# Patient Record
Sex: MALE | Race: BLACK OR AFRICAN AMERICAN | NOT HISPANIC OR LATINO | Employment: FULL TIME | URBAN - METROPOLITAN AREA
[De-identification: names, ages, dates, MRNs, and addresses within clinical notes are randomized per-mention and may not be internally consistent; named-entity substitution may affect disease eponyms.]

---

## 2018-09-17 ENCOUNTER — HOSPITAL ENCOUNTER (EMERGENCY)
Facility: HOSPITAL | Age: 48
Discharge: HOME | End: 2018-09-17
Attending: EMERGENCY MEDICINE | Admitting: EMERGENCY MEDICINE
Payer: COMMERCIAL

## 2018-09-17 VITALS
TEMPERATURE: 98.5 F | OXYGEN SATURATION: 99 % | RESPIRATION RATE: 18 BRPM | SYSTOLIC BLOOD PRESSURE: 149 MMHG | DIASTOLIC BLOOD PRESSURE: 95 MMHG | HEART RATE: 58 BPM

## 2018-09-17 DIAGNOSIS — G43.809 MIGRAINE VARIANTS, NOT INTRACTABLE: Primary | ICD-10-CM

## 2018-09-17 PROCEDURE — 99283 EMERGENCY DEPT VISIT LOW MDM: CPT

## 2018-09-17 RX ORDER — TOPIRAMATE 25 MG/1
25 CAPSULE, COATED PELLETS ORAL 2 TIMES DAILY
Qty: 60 CAPSULE | Refills: 0 | Status: SHIPPED | OUTPATIENT
Start: 2018-09-17 | End: 2018-10-17

## 2018-09-17 RX ORDER — SUMATRIPTAN 25 MG/1
25 TABLET, FILM COATED ORAL ONCE AS NEEDED
Qty: 10 TABLET | Refills: 0 | Status: SHIPPED | OUTPATIENT
Start: 2018-09-17

## 2018-09-17 RX ORDER — METOCLOPRAMIDE 10 MG/1
10 TABLET ORAL ONCE
Status: COMPLETED | OUTPATIENT
Start: 2018-09-17 | End: 2018-09-17

## 2018-09-17 RX ORDER — METOCLOPRAMIDE 10 MG/1
10 TABLET ORAL EVERY 6 HOURS
Qty: 30 TABLET | Refills: 0 | Status: SHIPPED | OUTPATIENT
Start: 2018-09-17

## 2018-09-17 RX ADMIN — METOCLOPRAMIDE HYDROCHLORIDE 10 MG: 10 TABLET ORAL at 21:39

## 2018-09-18 NOTE — DISCHARGE INSTRUCTIONS
Acute Headache   WHAT YOU NEED TO KNOW:   An acute headache is pain or discomfort that starts suddenly and gets worse quickly  You may have an acute headache only when you feel stress or eat certain foods  Other acute headache pain can happen every day, and sometimes several times a day  DISCHARGE INSTRUCTIONS:   Return to the emergency department if:   · You have severe pain  · You have numbness or weakness on one side of your face or body  · You have a headache that occurs after a blow to the head, a fall, or other trauma  · You have a headache, are forgetful or confused, or have trouble speaking  · You have a headache, stiff neck, and a fever  Contact your healthcare provider if:   · You have a constant headache and are vomiting  · You have a headache each day that does not get better, even after treatment  · You have changes in your headaches, or new symptoms that occur when you have a headache  · You have questions or concerns about your condition or care  Medicines: You may need any of the following:  · Prescription pain medicine  may be given  The medicine your healthcare provider recommends will depend on the kind of headaches you have  You will need to take prescription headache medicines as directed to prevent a problem called rebound headache  These headaches happen with regular use of pain relievers for headache disorders  · NSAIDs , such as ibuprofen, help decrease swelling, pain, and fever  This medicine is available with or without a doctor's order  NSAIDs can cause stomach bleeding or kidney problems in certain people  If you take blood thinner medicine, always ask your healthcare provider if NSAIDs are safe for you  Always read the medicine label and follow directions  · Acetaminophen  decreases pain and fever  It is available without a doctor's order  Ask how much to take and how often to take it  Follow directions   Read the labels of all other medicines you are using to see if they also contain acetaminophen, or ask your doctor or pharmacist  Acetaminophen can cause liver damage if not taken correctly  Do not use more than 3 grams (3,000 milligrams) total of acetaminophen in one day  · Antidepressants  may be given for some kinds of headaches  · Take your medicine as directed  Contact your healthcare provider if you think your medicine is not helping or if you have side effects  Tell him or her if you are allergic to any medicine  Keep a list of the medicines, vitamins, and herbs you take  Include the amounts, and when and why you take them  Bring the list or the pill bottles to follow-up visits  Carry your medicine list with you in case of an emergency  Manage your symptoms:   · Apply heat or ice  on the headache area  Use a heat or ice pack  For an ice pack, you can also put crushed ice in a plastic bag  Cover the pack or bag with a towel before you apply it to your skin  Ice and heat both help decrease pain, and heat also helps decrease muscle spasms  Apply heat for 20 to 30 minutes every 2 hours  Apply ice for 15 to 20 minutes every hour  Apply heat or ice for as long and for as many days as directed  You may alternate heat and ice  · Relax your muscles  Lie down in a comfortable position and close your eyes  Relax your muscles slowly  Start at your toes and work your way up your body  · Keep a record of your headaches  Write down when your headaches start and stop  Include your symptoms and what you were doing when the headache began  Record what you ate or drank for 24 hours before the headache started  Describe the pain and where it hurts  Keep track of what you did to treat your headache and if it worked  Prevent an acute headache:   · Avoid anything that triggers an acute headache  Examples include exposure to chemicals, going to high altitude, or not getting enough sleep  Create a regular sleep routine   Go to sleep at the same time and wake up at the same time each day  Do not use electronic devices before bedtime  These may trigger a headache or prevent you from sleeping well  · Do not smoke  Nicotine and other chemicals in cigarettes and cigars can trigger an acute headache or make it worse  Ask your healthcare provider for information if you currently smoke and need help to quit  E-cigarettes or smokeless tobacco still contain nicotine  Talk to your healthcare provider before you use these products  · Limit alcohol as directed  Alcohol can trigger an acute headache or make it worse  If you have cluster headaches, do not drink alcohol during an episode  For other types of headaches, ask your healthcare provider if it is safe for you to drink alcohol  Ask how much is safe for you to drink, and how often  · Exercise as directed  Exercise can reduce tension and help with headache pain  Aim for 30 minutes of physical activity on most days of the week  Your healthcare provider can help you create an exercise plan  · Eat a variety of healthy foods  Healthy foods include fruits, vegetables, low-fat dairy products, lean meats, fish, whole grains, and cooked beans  Your healthcare provider or dietitian can help you create meals plans if you need to avoid foods that trigger headaches  Follow up with your healthcare provider as directed:  Bring your headache record with you when you see your healthcare provider  Write down your questions so you remember to ask them during your visits  © 2017 [employer] Information is for End User's use only and may not be sold, redistributed or otherwise used for commercial purposes  All illustrations and images included in CareNotes® are the copyrighted property of A D A M , Inc  or [employer]  The above information is an  only  It is not intended as medical advice for individual conditions or treatments   Talk to your doctor, nurse or pharmacist before following any medical regimen to see if it is safe and effective for you  Marta Colon   Call [employer] at [phone] ([phone]) to obtain a primary care physician  They will be able to schedule you with a physician who sees patients with your insurance and physicians who see patients without insurance  In addition, they are able to schedule patients in all of the specialties offered by [facility] [address], on any campus  Additionally, when you call this number, they can schedule for any office, any provider, any specialty, at any location, within the [address] [place] [address] Avenue  They can also evaluate your insurance situation to ensure there are no concerns with coverage, while scheduling your appointment

## 2018-09-19 NOTE — ED PROVIDER NOTES
History  Chief Complaint   Patient presents with    Migraine     Pt presents to the ED for evaluation of a headache that started 2 weeks ago, pt reports hx of migraine over 5 years ago  Pt reports drinking red wine and noticed migraines started ago  Pt reports pressure at front of his head, "woke me up from sleep," denies blurred vision, or sensitivity to light or sound     Nikhil Pandey ([id number]) is a 53 y o   male Adult patient, presenting to the Emergency Department, accompanied by Nathalie Mar, who presents with a chief complaint of Patient presents with: Migraine: Pt presents to the ED for evaluation of a headache that started 2 weeks ago, pt reports hx of migraine over 5 years ago  Pt reports drinking red wine and noticed migraines started ago  Pt reports pressure at front of his head, "woke me up from sleep," denies blurred vision, or sensitivity to light or sound  Patient presents a 36-year-old male, seen unaccompanied, who presents with relative medical history including migraine headaches  The patient states he was in his normal state of health up until yesterday, when he began have a migraine, which was associated with sexual activity  The patient states that his migraine began abruptly upon checked ablation, and has consisted ever since  The patient states that, in attempt to palliate this headache, he has taken Excedrin migraine, with only mild resolution of symptoms  In addition, the patient states that he has tried to drink caffeine on multiple occasions, which has been effective in the past, but this was ineffective this time  The patient states that his initial headache was not associated with nausea, nor light sensitivity, but, throughout the course of the past 24 hours, patient's headache has increased in intensity, his, light sensitive, as well as sensitive to both sound and light   The patient does state, however, that he has had headaches this severe in the past, but they have not occurred in the past several months  In addition, the patient states that he has had prior prescriptions at home for sumatriptan, Flexeril, naproxen, and metoclopramide, but, as he had not had migraine headaches in the past several months, he does not have any more of these medications at home  Medications: Patient takes no medications on a regular basis  Allergies: No reported drug allergies, No reported food allergies, No reported environmental allergies  Overnight Hospitalizations: No prior hospitalizations  Vaccinations: Vaccinations UTD, as per Patient  Past Medical History: Past Medical History Includes:  Migraine headache    Code Status: No Order              None       History reviewed  No pertinent past medical history  History reviewed  No pertinent surgical history  History reviewed  No pertinent family history  I have reviewed and agree with the history as documented  Social History   Substance Use Topics    Smoking status: Never Smoker    Smokeless tobacco: Not on file    Alcohol use Yes        Review of Systems  Review of Systems: The Patient/Parent Denies the following: Negative, Except as noted in HPI  Physical Exam  Physical Exam  General: 53 y o  male patient, who appears their stated age, in mild distress  Skin: No rashes, masses, or lesions noted  HEENT: Atraumatic & Normocephalic  External ears normal, with no noted abnormalities or deformities  Bilateral canals examined, without noted edema or discomfort  No pain while pulling the tragus  TM well visualized bilaterally, with no noted obstruction, effusion, erythema, or air fluid levels  No noted enlargement of the mastoid processes bilaterally  EOMI, PERRL, Conjunctiva without injection bilaterally  No conjunctival drainage noted bilaterally  Nares patent bilaterally, with no noted obstructions, erythema, or drainage  No noted rhinorrhea   Pharynx well visualized, with no exudate noted in the posterior pharynx  Tonsils are not enlarged  Gingival surfaces are within normal limits  Neck: Soft, supple, and non-tender  No enlargement of the anterior cervical, posterior cervical, or occipital lymph notes  Cardiac: Regular rate and rhythm, with no noted murmurs, rubs, or gallops  Pulmonary: Normal Appearance  Clear to auscultation, with no noted rales, rhonchi, or wheezes  Abdomen: Normal appearance  Dull to palpation, except over the gastric bubble, which was mildly tympanic  Bowel sounds were within normal limits, with no noted high pitch sounds heard  Negative Agosto sign  No pain with palpation at [facility]  MSK: Joint ROM grossly normal, actively and passively, to all extremities  No noted joint swelling  Normal Gait  Neuro: Cranial nerves 2-12 grossly intact  Normal sensation grossly  Psych: Normal affect and responsiveness  Vital Signs  ED Triage Vitals [09/17/18 1758]   Temperature Pulse Respirations Blood Pressure SpO2   98 5 °F (36 9 °C) 77 18 (!) 180/95 99 %      Temp Source Heart Rate Source Patient Position - Orthostatic VS BP Location FiO2 (%)   Oral Monitor Sitting Left arm --      Pain Score       --           Vitals:    09/17/18 1758 09/17/18 2141   BP: (!) 180/95 149/95   Pulse: 77 58   Patient Position - Orthostatic VS: Sitting Sitting       Visual Acuity      ED Medications  Medications   metoclopramide (REGLAN) tablet 10 mg (10 mg Oral Given 9/17/18 2139)       Diagnostic Studies  Results Reviewed     None                 No orders to display              Procedures  Procedures       Phone Contacts  ED Phone Contact    ED Course                               MDM  Number of Diagnoses or Management Options  Migraine variants, not intractable: new and does not require workup  Diagnosis management comments: Most likely diagnosis is not intractable migraine    Primary consideration diagnosis include the patient's known history of migraines, the presence own or with headache, the presence of onset after acute sexual activity, as well as the presence of high sensitivity and sound sensitivity  In addition, the patient has a notable or associated with this headache, which is not of the norm for his prior migraine headaches  Treatment options were discussed with this patient, where as the patient stated that he would rather not stay in the emergency department, as the sound in the emergency department makes his headache worse  In addition, the patient states that he would rather not have IV abortive therapy, and would be satisfied with prescriptions for his prior migraine medications, to be taken at home  As such, the patient was provided with prescriptions for topiramate, Reglan, and sumatriptan  In addition, the patient was instructed to follow up with the emergency department should the character and nature of his initial presenting headache greatly changed, if the patient develops new migraine symptoms, with the patient develops syncope, near syncope, lightheadedness, chest pain, or respiratory distress  The patient expressed agreement and understanding with this plan, and will follow up with his primary care provider and neurologist on outpatient basis         Amount and/or Complexity of Data Reviewed  Decide to obtain previous medical records or to obtain history from someone other than the patient: yes  Obtain history from someone other than the patient: yes  Review and summarize past medical records: yes  Discuss the patient with other providers: yes  Independent visualization of images, tracings, or specimens: yes    Risk of Complications, Morbidity, and/or Mortality  Presenting problems: moderate  Diagnostic procedures: moderate  Management options: moderate    Patient Progress  Patient progress: improved    CritCare Time    Disposition  Final diagnoses:   Migraine variants, not intractable     Time reflects when diagnosis was documented in both MDM as applicable and the Disposition within this note     Time User Action Codes Description Comment    9/17/2018  9:33 PM Selwyn Hollins Add [G46 829] Migraine variants, not intractable       ED Disposition     ED Disposition Condition Comment    Discharge  Tamiko Last discharge to home/self care  Condition at discharge: Good        Follow-up Information     Follow up With Specialties Details Why [address] Neurology [facility]   [address] [facility]  [phone]    Your PCP  In 3 days            Discharge Medication List as of 9/17/2018  9:37 PM      START taking these medications    Details   metoclopramide (REGLAN) 10 mg tablet Take 1 tablet (10 mg total) by mouth every 6 (six) hours, Starting Mon 9/17/2018, Print      SUMAtriptan (IMITREX) 25 mg tablet Take 1 tablet (25 mg total) by mouth once as needed for migraine for up to 1 dose, Starting Mon 9/17/2018, Print      topiramate (TOPAMAX) 25 mg sprinkle capsule Take 1 capsule (25 mg total) by mouth 2 (two) times a day for 30 days, Starting Mon 9/17/2018, Until Wed 10/17/2018, Print           No discharge procedures on file      ED Provider  Electronically Signed by           Kecia Queen PA-C  09/19/18 TRISH Orta  09/19/18 9379

## 2024-05-20 ENCOUNTER — HOSPITAL ENCOUNTER (EMERGENCY)
Facility: HOSPITAL | Age: 54
Discharge: HOME | End: 2024-05-20
Attending: EMERGENCY MEDICINE | Admitting: EMERGENCY MEDICINE
Payer: OTHER MISCELLANEOUS

## 2024-05-20 ENCOUNTER — APPOINTMENT (EMERGENCY)
Dept: RADIOLOGY | Facility: HOSPITAL | Age: 54
End: 2024-05-20
Payer: OTHER MISCELLANEOUS

## 2024-05-20 ENCOUNTER — APPOINTMENT (OUTPATIENT)
Dept: URGENT CARE | Age: 54
End: 2024-05-20
Payer: OTHER MISCELLANEOUS

## 2024-05-20 VITALS
HEART RATE: 85 BPM | WEIGHT: 218.26 LBS | OXYGEN SATURATION: 98 % | TEMPERATURE: 98.2 F | RESPIRATION RATE: 16 BRPM | SYSTOLIC BLOOD PRESSURE: 149 MMHG | DIASTOLIC BLOOD PRESSURE: 96 MMHG

## 2024-05-20 DIAGNOSIS — S80.10XA CONTUSION OF KNEE AND LOWER LEG: ICD-10-CM

## 2024-05-20 DIAGNOSIS — S90.31XA CONTUSION OF RIGHT FOOT, INITIAL ENCOUNTER: ICD-10-CM

## 2024-05-20 DIAGNOSIS — S80.01XA CONTUSION OF RIGHT KNEE, INITIAL ENCOUNTER: Primary | ICD-10-CM

## 2024-05-20 DIAGNOSIS — S80.00XA CONTUSION OF KNEE AND LOWER LEG: ICD-10-CM

## 2024-05-20 PROCEDURE — 99283 EMERGENCY DEPT VISIT LOW MDM: CPT

## 2024-05-20 PROCEDURE — G0382 LEV 3 HOSP TYPE B ED VISIT: HCPCS

## 2024-05-20 PROCEDURE — 73564 X-RAY EXAM KNEE 4 OR MORE: CPT

## 2024-05-20 PROCEDURE — 73630 X-RAY EXAM OF FOOT: CPT

## 2024-05-20 PROCEDURE — 99284 EMERGENCY DEPT VISIT MOD MDM: CPT | Performed by: EMERGENCY MEDICINE

## 2024-05-20 PROCEDURE — 73590 X-RAY EXAM OF LOWER LEG: CPT

## 2024-05-20 RX ORDER — IBUPROFEN 600 MG/1
600 TABLET ORAL ONCE
Status: COMPLETED | OUTPATIENT
Start: 2024-05-20 | End: 2024-05-20

## 2024-05-20 RX ORDER — ACETAMINOPHEN 325 MG/1
975 TABLET ORAL ONCE
Status: COMPLETED | OUTPATIENT
Start: 2024-05-20 | End: 2024-05-20

## 2024-05-20 RX ADMIN — IBUPROFEN 600 MG: 600 TABLET, FILM COATED ORAL at 19:42

## 2024-05-20 RX ADMIN — ACETAMINOPHEN 975 MG: 325 TABLET, FILM COATED ORAL at 19:41

## 2024-05-20 NOTE — ED PROVIDER NOTES
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      Ana is a 53-year-old man who presents to the emergency department for right lower leg injury.  Patient reports 4 days ago while he was at work he was using an electric left, and backed up onto his foot, trapping his foot underneath and causing pain from the right foot up to the lower aspect of the knee.  Reports that he has been able to bear weight since then, but he was \"hopping around most of the time\".  Reports that the pain is continued.  He has only taken Advil and Aleve on Thursday evening.  Has not taken any pain medication since then.         Prior to Admission Medications   Prescriptions Last Dose Informant Patient Reported? Taking?   SUMAtriptan (IMITREX) 25 mg tablet   No No   Sig: Take 1 tablet (25 mg total) by mouth once as needed for migraine for up to 1 dose   metoclopramide (REGLAN) 10 mg tablet   No No   Sig: Take 1 tablet (10 mg total) by mouth every 6 (six) hours   topiramate (TOPAMAX) 25 mg sprinkle capsule   No No   Sig: Take 1 capsule (25 mg total) by mouth 2 (two) times a day for 30 days      Facility-Administered Medications: None       History reviewed. No pertinent past medical history.    History reviewed. No pertinent surgical history.    History reviewed. No pertinent family history.  I have reviewed and agree with the history as documented.    E-Cigarette/Vaping    E-Cigarette Use Never User      E-Cigarette/Vaping Substances    Nicotine No     THC No     CBD No      Social History     Tobacco Use    Smoking status: Never   Vaping Use    Vaping status: Never Used   Substance Use Topics    Alcohol use: Yes    Drug use: No        Review of Systems   Constitutional:  Negative for activity change, chills and fever.   Eyes:  Negative for pain and visual disturbance.   Respiratory:  Negative for chest tightness and " shortness of breath.    Cardiovascular:  Negative for chest pain.   Gastrointestinal:  Negative for abdominal pain and vomiting.   Genitourinary:  Negative for dysuria and hematuria.   Musculoskeletal:  Positive for arthralgias and joint swelling.   Skin:  Negative for rash and wound.   Neurological:  Negative for dizziness, syncope, light-headedness and headaches.   Psychiatric/Behavioral: Negative.         Physical Exam  ED Triage Vitals   Temperature Pulse Respirations Blood Pressure SpO2   05/20/24 1905 05/20/24 1905 05/20/24 1905 05/20/24 1905 05/20/24 1905   98.2 °F (36.8 °C) 85 16 149/96 98 %      Temp Source Heart Rate Source Patient Position - Orthostatic VS BP Location FiO2 (%)   05/20/24 1905 05/20/24 1905 05/20/24 1905 05/20/24 1905 --   Oral Monitor Sitting Right arm       Pain Score       05/20/24 1941       8             Orthostatic Vital Signs  Vitals:    05/20/24 1905   BP: 149/96   Pulse: 85   Patient Position - Orthostatic VS: Sitting       Physical Exam  Vitals and nursing note reviewed.   Constitutional:       Appearance: Normal appearance.   HENT:      Head: Normocephalic and atraumatic.      Right Ear: External ear normal.      Left Ear: External ear normal.      Nose: Nose normal.      Mouth/Throat:      Mouth: Mucous membranes are moist.      Pharynx: Oropharynx is clear.   Eyes:      Extraocular Movements: Extraocular movements intact.      Conjunctiva/sclera: Conjunctivae normal.   Cardiovascular:      Rate and Rhythm: Normal rate.   Pulmonary:      Effort: Pulmonary effort is normal.   Abdominal:      General: Abdomen is flat.   Musculoskeletal:         General: Normal range of motion.      Cervical back: Normal range of motion and neck supple.      Comments: Patient has full range of motion of the right knee, and ankle when compared to the left.  Patient reports some minor pain with movement, but does not limit movement.    Right knee seems mildly more swollen than the left.   Skin:      General: Skin is warm and dry.      Comments: No apparent bruising or lacerations.   Neurological:      General: No focal deficit present.      Mental Status: He is alert and oriented to person, place, and time.      Sensory: No sensory deficit.      Motor: No weakness.      Coordination: Coordination normal.   Psychiatric:         Mood and Affect: Mood normal.         Behavior: Behavior normal.         ED Medications  Medications   ibuprofen (MOTRIN) tablet 600 mg (600 mg Oral Given 5/20/24 1942)   acetaminophen (TYLENOL) tablet 975 mg (975 mg Oral Given 5/20/24 1941)       Diagnostic Studies  Results Reviewed       None                   XR knee 4+ views Right injury    (Results Pending)   XR tibia fibula 2 views RIGHT    (Results Pending)   XR foot 3+ views RIGHT    (Results Pending)         Procedures  Procedures      ED Course  ED Course as of 05/20/24 2243   Mon May 20, 2024   2241 No fracture noted on the right knee, tib-fib, or foot x-rays.    2243 Patient was discharged with referral to orthopedic surgery for follow-up.                                       Medical Decision Making  Ana is a 53-year-old man who presents to the emergency department for right lower leg injury.    DDx includes but is not limited to: R lower leg contusion vs sprain vs fracture    See ED course for MDM    Results shared with patient. Return precautions given and patient expresses understanding and is comfortable with discharge.      Amount and/or Complexity of Data Reviewed  Radiology: ordered.    Risk  OTC drugs.  Prescription drug management.          Disposition  Final diagnoses:   Contusion of right knee, initial encounter   Contusion of right foot, initial encounter   Contusion of knee and lower leg     Time reflects when diagnosis was documented in both MDM as applicable and the Disposition within this note       Time User Action Codes Description Comment    5/20/2024  8:03 PM Elroy Reardon Add [S80.01XA]  Contusion of right knee, initial encounter     5/20/2024  8:04 PM Elroy Reardon Add [S90.31XA] Contusion of right foot, initial encounter     5/20/2024  8:04 PM Elroy Reardon Add [S80.00XA,  S80.10XA] Contusion of knee and lower leg           ED Disposition       ED Disposition   Discharge    Condition   Stable    Date/Time   Mon May 20, 2024  8:03 PM    Comment   Ana Rivas discharge to home/self care.                   Follow-up Information       Follow up With Specialties Details Why Contact Dorita Beverly, DO Orthopedic Surgery Call  As needed [address] [place] [address]  [phone]              Discharge Medication List as of 5/20/2024  8:07 PM        CONTINUE these medications which have NOT CHANGED    Details   metoclopramide (REGLAN) 10 mg tablet Take 1 tablet (10 mg total) by mouth every 6 (six) hours, Starting Mon 9/17/2018, Print      SUMAtriptan (IMITREX) 25 mg tablet Take 1 tablet (25 mg total) by mouth once as needed for migraine for up to 1 dose, Starting Mon 9/17/2018, Print      topiramate (TOPAMAX) 25 mg sprinkle capsule Take 1 capsule (25 mg total) by mouth 2 (two) times a day for 30 days, Starting Mon 9/17/2018, Until Wed 10/17/2018, Print               PDMP Review       None             ED Provider  Attending physically available and evaluated Ana Rivas. I managed the patient along with the ED Attending.    Electronically Signed by           Claire Dow MD  05/20/24 3690     Attending Attestation (For Attendings USE Only)... Scribe Attestation (For Scribes USE Only)...

## 2024-05-21 NOTE — DISCHARGE INSTRUCTIONS
DIAGNOSIS: RIGHT KNEE/ LOWER LEG AND FOOT CONTUSION- BRUISE     - ACTIVITY AS TOLERATED     - EXPECT PAIN SWELLING OVER NEXT 23- WEEKS SHOULD DECREASE OVER TIME     - FOR PAIN- OVER THE COUNTER GENERIC ACETAMINOPHEN 500 MG EVERY 4 HRS WHILE AWAKE    - ACE WRAP TO R KNEE AS NEEDED FOR COMFORT    - IF AFTER 2 WEEKS NOT BACK TO NORMAL WITH R KNEE  PLEASE CALL  THE ORTHOPEDIST TO SCHEDULE AN APPOINTMENT

## 2024-05-23 NOTE — ED ATTENDING ATTESTATION
5/20/2024  I, Elroy Reardon MD, saw and evaluated the patient. I have discussed the patient with the resident/non-physician practitioner and agree with the resident's/non-physician practitioner's findings, Plan of Care, and MDM as documented in the resident's/non-physician practitioner's note, except where noted. All available labs and Radiology studies were reviewed.  I was present for key portions of any procedure(s) performed by the resident/non-physician practitioner and I was immediately available to provide assistance.       At this point I agree with the current assessment done in the Emergency Department.  I have conducted an independent evaluation of this patient a history and physical is as follows:see h and p above     ED Course  ED Course as of 05/23/24 1636   Mon May 20, 2024   1956 R KNEE /TIB/FIB/FOOT  XRAY - NO FX          Critical Care Time  Procedures

## 2024-10-26 ENCOUNTER — APPOINTMENT (OUTPATIENT)
Dept: RADIOLOGY | Age: 54
End: 2024-10-26
Payer: OTHER MISCELLANEOUS

## 2024-10-26 ENCOUNTER — OCCMED (OUTPATIENT)
Dept: URGENT CARE | Age: 54
End: 2024-10-26
Payer: OTHER MISCELLANEOUS

## 2024-10-26 DIAGNOSIS — S49.92XA INJURY OF LEFT SHOULDER, INITIAL ENCOUNTER: ICD-10-CM

## 2024-10-26 DIAGNOSIS — S46.912A STRAIN OF LEFT SHOULDER, INITIAL ENCOUNTER: Primary | ICD-10-CM

## 2024-10-26 PROCEDURE — G0384 LEV 5 HOSP TYPE B ED VISIT: HCPCS

## 2024-10-26 PROCEDURE — 99285 EMERGENCY DEPT VISIT HI MDM: CPT

## 2024-10-26 PROCEDURE — 73030 X-RAY EXAM OF SHOULDER: CPT

## 2024-10-30 ENCOUNTER — APPOINTMENT (OUTPATIENT)
Dept: URGENT CARE | Age: 54
End: 2024-10-30
Payer: OTHER MISCELLANEOUS

## 2024-10-30 PROCEDURE — 99213 OFFICE O/P EST LOW 20 MIN: CPT

## 2024-11-11 ENCOUNTER — HOSPITAL ENCOUNTER (EMERGENCY)
Facility: HOSPITAL | Age: 54
Discharge: HOME | End: 2024-11-11
Attending: EMERGENCY MEDICINE
Payer: OTHER MISCELLANEOUS

## 2024-11-11 VITALS
DIASTOLIC BLOOD PRESSURE: 96 MMHG | HEART RATE: 83 BPM | OXYGEN SATURATION: 98 % | SYSTOLIC BLOOD PRESSURE: 171 MMHG | TEMPERATURE: 98 F | RESPIRATION RATE: 16 BRPM

## 2024-11-11 DIAGNOSIS — S14.3XXA INJURY OF LEFT BRACHIAL PLEXUS, INITIAL ENCOUNTER: Primary | ICD-10-CM

## 2024-11-11 DIAGNOSIS — S44.92XA NEURAPRAXIA OF LEFT UPPER EXTREMITY, INITIAL ENCOUNTER: ICD-10-CM

## 2024-11-11 PROCEDURE — 99284 EMERGENCY DEPT VISIT MOD MDM: CPT | Performed by: EMERGENCY MEDICINE

## 2024-11-11 PROCEDURE — 99284 EMERGENCY DEPT VISIT MOD MDM: CPT

## 2024-11-11 RX ORDER — GABAPENTIN 100 MG/1
100 CAPSULE ORAL 3 TIMES DAILY
Qty: 42 CAPSULE | Refills: 0 | Status: SHIPPED | OUTPATIENT
Start: 2024-11-11 | End: 2024-11-25

## 2024-11-11 RX ORDER — NAPROXEN 500 MG/1
500 TABLET ORAL 2 TIMES DAILY WITH MEALS
Qty: 28 TABLET | Refills: 0 | Status: SHIPPED | OUTPATIENT
Start: 2024-11-11 | End: 2024-11-25

## 2024-11-11 NOTE — ED ATTENDING ATTESTATION
"11/11/2024  I, Melisa Norton MD, saw and evaluated the patient. I have discussed the patient with the resident/non-physician practitioner and agree with the resident's/non-physician practitioner's findings, Plan of Care, and MDM as documented in the resident's/non-physician practitioner's note, except where noted. All available labs and Radiology studies were reviewed.  I was present for key portions of any procedure(s) performed by the resident/non-physician practitioner and I was immediately available to provide assistance.       At this point I agree with the current assessment done in the Emergency Department.  I have conducted an independent evaluation of this patient a history and physical is as follows:    54-year-old male without significant past medical history presenting for evaluation of left arm pain.  Patient states on October 26 he fell out of his truck.  States he was slipping while holding onto the steering wheel and stretched his left upper extremity.  Had immediate pain in the area of his left chest and arm.  Was seen by occupational medicine and had x-rays obtained, chart reviewed, which were negative for acute osseous abnormality.  Patient given steroids and analgesia.  [facility] and given a muscle relaxer.  Patient states he is still having significant pain in his upper chest going down his arm that is keeping him up at night.  Patient also states his arm is \"smaller\".  Has been unable to have an MRI yet.  Patient also requires a new work note.  Denies fever, upper respiratory symptoms, chest pain, shortness of breath, nausea, vomiting, abdominal pain.    Please see resident documentation for histories and review of systems.    Exam: Vital signs and nursing notes reviewed  General: Awake, alert, no acute distress  HEENT: Normocephalic, atraumatic, mucous membranes moist  Neck: Supple, no midline cervical tenderness to palpation, no left " trapezial tenderness to palpation  Back: No midline thoracic or lumbar tenderness to palpation  Heart: Regular rate and rhythm  Lungs: Clear to auscultation bilateral without wheezes, rales, or rhonchi  Abdomen: Soft, nontender, nondistended  Extremities: No swelling or deformity.  Atrophy is noted to the left bicep and tricep.  Patient has weakness with flexion and extension at the left elbow; strength is 5/5 with flexion and extension at the bilateral shoulders with 5/5 strength with flexion and extension of the right elbow but 3/5 the left elbow.  Sensation intact and equal bilateral upper extremities.  Patient is unable to flex or extend his wrist and cannot open his hand with weakness of the intrinsic hand muscles.  Good distal pulses and capillary refill.  Skin: Intact, no rash  Neuro: As above with left upper extremity distal weakness    ED course/Medical Decision Makin-year-old male presenting for evaluation of left arm pain.  Differential diagnosis includes acute fracture, dislocation, cervical radiculopathy, brachial plexus injury, neuropraxia, radial nerve palsy.  There is no midline cervical tenderness to palpation with low suspicion for cervical radiculopathy.  Low suspicion for central cord syndrome.  Clinically, I am concerned for brachial plexus injury given an overstretch injury through the chest wall and left upper extremity.  Patient has evidence of a radial nerve palsy with significant atrophy to his upper extremity, as well.  Likely an element of neuropraxia, too.  Suspect he has neuropathic pain.  Patient was able to schedule his MRI for Friday while he was in the emergency department.  However, feel he would benefit from orthopedics evaluation, as well.  Patient will also need to see occupational medicine again.  Will initiate gabapentin and patient counseled on titration of this medication in order to treat his pain.  Counseled on side effects of this medication, as well.  Discussed  other supportive measures and provided with a wrist splint for comfort.  At this time, patient is appropriate for discharge home with outpatient follow-up.  Return precautions discussed.  Patient is in agreement and understanding of these instructions.    Diagnosis: Brachial plexus injury, radial nerve palsy, neuropraxia  Disposition: Discharge

## 2024-11-11 NOTE — Clinical Note
Ana Rivas was seen and treated in our emergency department on 11/11/2024.        No work until cleared by Family Doctor/Orthopedics        Diagnosis:     Ana  is off the rest of the shift today.    He may return on this date:          If you have any questions or concerns, please don't hesitate to call.      Estefania Munoz MD    ______________________________           _______________          _______________  Hospital Representative                              Date                                Time

## 2024-11-11 NOTE — ED PROVIDER NOTES
Time reflects when diagnosis was documented in both MDM as applicable and the Disposition within this note       Time User Action Codes Description Comment    11/11/2024  1:43 PM Estefania Munoz Add [S14.3XXA] Injury of left brachial plexus, initial encounter     11/11/2024  1:43 PM Estefania Munoz Add [S44.92XA] Neurapraxia of left upper extremity, initial encounter           ED Disposition       ED Disposition   Discharge    Condition   Stable    Date/Time   Mon Nov 11, 2024  1:46 PM    Comment   Ana Rivas discharge to home/self care.                   Assessment & Plan       Medical Decision Making  Ddx: Brachial plexus injury, rotator cuff injury    Pt with weakness of hand grasp, push, decreased tone of the biceps and triceps muscles, wrist drop suspect brachial plexus injury.   Pt was able to schedule an MRI while waiting in ED.   Pt given RX for gabapentin and naproxen    Instructed to keep MRI appointment and follow up with occupation al medicine.     Risk  Prescription drug management.             Medications - No data to display    ED Risk Strat Scores                                               History of Present Illness       Chief Complaint   Patient presents with    Arm Pain     Left arm / shoulder / chest pain since almost falling out of truck (10/26) and hanging on to wheel with left arm. Pt was already seen and given steroids and pain medication but not improving. Pt is approved for MRi but has not scheduled it yet.        History reviewed. No pertinent past medical history.   History reviewed. No pertinent surgical history.   History reviewed. No pertinent family history.   Social History     Tobacco Use    Smoking status: Never   Vaping Use    Vaping status: Never Used   Substance Use Topics    Alcohol use: Yes    Drug use: No      E-Cigarette/Vaping    E-Cigarette Use Never User       E-Cigarette/Vaping Substances    Nicotine No     THC No     CBD No       I have reviewed and  agree with the history as documented.     The patient is a 54 year old male who presents to ED for evaluation of left arm pain, weakness. Pt states he was getting out of truck on 10/26 and he started to fall but caught himself with the left arm and had onset of pain in the shoulder and down the arm. He has been seen by Occupational medicine but has had worsening pain since finishing steroids and he is also noticing weakness of the arm and decreased muscle size in the upper left arm. He states the pain is keeping him up at night and he is unable to sleep. Denies bleeding, SOB, chest pain, fever, rash        Review of Systems   Constitutional:  Positive for activity change. Negative for fever.   Respiratory:  Negative for shortness of breath.    Gastrointestinal:  Negative for abdominal pain.   Musculoskeletal:  Positive for myalgias and neck pain. Negative for back pain.   Skin:  Negative for color change and wound.   Neurological:  Positive for weakness.           Objective       ED Triage Vitals [11/11/24 1224]   Temperature Pulse Blood Pressure Respirations SpO2 Patient Position - Orthostatic VS   98 °F (36.7 °C) 83 (!) 171/96 16 98 % Sitting      Temp src Heart Rate Source BP Location FiO2 (%) Pain Score    -- -- Right arm -- 9      Vitals      Date and Time Temp Pulse SpO2 Resp BP Pain Score FACES Pain Rating User   11/11/24 1224 98 °F (36.7 °C) 83 98 % 16 171/96 9 -- RLN            Physical Exam    Results Reviewed       None            No orders to display       Procedures    ED Medication and Procedure Management   Prior to Admission Medications   Prescriptions Last Dose Informant Patient Reported? Taking?   SUMAtriptan (IMITREX) 25 mg tablet   No No   Sig: Take 1 tablet (25 mg total) by mouth once as needed for migraine for up to 1 dose   metoclopramide (REGLAN) 10 mg tablet   No No   Sig: Take 1 tablet (10 mg total) by mouth every 6 (six) hours   topiramate (TOPAMAX) 25 mg sprinkle capsule   No No   Sig:  Take 1 capsule (25 mg total) by mouth 2 (two) times a day for 30 days      Facility-Administered Medications: None     Discharge Medication List as of 11/11/2024  1:57 PM        START taking these medications    Details   gabapentin (Neurontin) 100 mg capsule Take 1 capsule (100 mg total) by mouth 3 (three) times a day for 14 days, Starting Mon 11/11/2024, Until Mon 11/25/2024, Normal      naproxen (EC NAPROSYN) 500 MG EC tablet Take 1 tablet (500 mg total) by mouth 2 (two) times a day with meals for 14 days, Starting Mon 11/11/2024, Until Mon 11/25/2024, Normal           CONTINUE these medications which have NOT CHANGED    Details   metoclopramide (REGLAN) 10 mg tablet Take 1 tablet (10 mg total) by mouth every 6 (six) hours, Starting Mon 9/17/2018, Print      SUMAtriptan (IMITREX) 25 mg tablet Take 1 tablet (25 mg total) by mouth once as needed for migraine for up to 1 dose, Starting Mon 9/17/2018, Print      topiramate (TOPAMAX) 25 mg sprinkle capsule Take 1 capsule (25 mg total) by mouth 2 (two) times a day for 30 days, Starting Mon 9/17/2018, Until Wed 10/17/2018, Print             ED SEPSIS DOCUMENTATION   Time reflects when diagnosis was documented in both MDM as applicable and the Disposition within this note       Time User Action Codes Description Comment    11/11/2024  1:43 PM Estefania Munoz [S14.3XXA] Injury of left brachial plexus, initial encounter     11/11/2024  1:43 PM Estefania Munoz [S44.92XA] Neurapraxia of left upper extremity, initial encounter                  Estefania Munoz MD  11/17/24 0011

## 2024-11-11 NOTE — Clinical Note
Ana Rivas was seen and treated in our emergency department on 11/11/2024.        No work until cleared by Family Doctor/Orthopedics        Diagnosis:     Ana  is off the rest of the shift today.    He may return on this date:          If you have any questions or concerns, please don't hesitate to call.      Estefania Mnuoz MD    ______________________________           _______________          _______________  Hospital Representative                              Date                                Time

## 2024-11-11 NOTE — ED NOTES
PT awake and alert, no distress noted. No other questions upon d/c.     Alona James, RODOLFO  11/11/24 7932

## 2024-11-11 NOTE — DISCHARGE INSTRUCTIONS
Take gabapentin 3 times per day. You may increase the dose after a few days but it can make you sleepy so use caution with driving.    Take tylenol 650mg every 6 hours as needed for pain. Do not take ibuprofen products with the Naprosyn.

## 2024-11-21 ENCOUNTER — APPOINTMENT (OUTPATIENT)
Dept: URGENT CARE | Age: 54
End: 2024-11-21
Payer: OTHER MISCELLANEOUS

## 2024-11-21 PROCEDURE — 99213 OFFICE O/P EST LOW 20 MIN: CPT | Performed by: EMERGENCY MEDICINE

## 2024-11-22 ENCOUNTER — OFFICE VISIT (OUTPATIENT)
Dept: OBGYN CLINIC | Facility: CLINIC | Age: 54
End: 2024-11-22

## 2024-11-22 VITALS — WEIGHT: 218 LBS | HEIGHT: 74 IN | BODY MASS INDEX: 27.98 KG/M2

## 2024-11-22 DIAGNOSIS — M62.59 ATROPHY OF MUSCLE OF MULTIPLE SITES: Primary | ICD-10-CM

## 2024-11-22 DIAGNOSIS — S14.3XXA INJURY OF LEFT BRACHIAL PLEXUS, INITIAL ENCOUNTER: ICD-10-CM

## 2024-11-22 DIAGNOSIS — S44.92XA NEURAPRAXIA OF LEFT UPPER EXTREMITY, INITIAL ENCOUNTER: ICD-10-CM

## 2024-11-22 PROCEDURE — 99204 OFFICE O/P NEW MOD 45 MIN: CPT | Performed by: ORTHOPAEDIC SURGERY

## 2024-11-22 NOTE — PROGRESS NOTES
Assessment:  1. Atrophy of muscle of multiple sites  MRI brachial plexus wo contrast    EMG 1 Limb    Ambulatory Referral to Neurosurgery    Ambulatory referral to PT/OT hand therapy      2. Injury of left brachial plexus, initial encounter  Ambulatory Referral to Orthopedic Surgery    MRI brachial plexus wo contrast    EMG 1 Limb    Ambulatory Referral to Neurosurgery    Ambulatory referral to PT/OT hand therapy      3. Neurapraxia of left upper extremity, initial encounter  Ambulatory Referral to Orthopedic Surgery    MRI brachial plexus wo contrast    EMG 1 Limb    Ambulatory Referral to Neurosurgery    Ambulatory referral to PT/OT hand therapy        Patient Active Problem List   Diagnosis    Neuropraxia of left upper extremity    Atrophy of muscle of multiple sites           Plan:      Diagnostics reviewed and physical exam performed.  Diagnosis, treatment options and associated risks were discussed with the patient including no treatment, nonsurgical treatment and potential for surgical intervention.  The patient was given the opportunity to ask questions regarding each.   Order placed today for patient to initiate outpatient Occupational Therapy for left upper extremity to see if he can get improvement in range of motion and strength but also the potential for splinting to prevent flexion contractures  Order placed today for EMG to rule out or rule in any neuropathic distal damage but also an MRI to evaluate the brachial plexus  May use ice or heat as needed for pain relief  May take NSAIDs/Tylenol as needed for pain control  No further treatment by this doctor may need to see hand specialist for possible tendon transfer if the nerves do not heal or recovery is limited          Subjective:     Patient ID:  Ana Rivas 54 y.o. male    HPI  Ana Rivas is a 54 y.o. male who presents today for initial evaluation of left shoulder injury sustained 10/26/2024. He was getting out of his truck and fell, he  "held onto the steering wheel causing a traction injury to his left shoulder and brachial plexus. He immediately felt a zing or \"funny bone feeling\" in his entire left upper extremity. He complains of weakness and atrophy of his entire left upper extremity, he is unable to extend his wrist. He has difficulty with putting his hand into his pocket. He has not treated with Physical Therapy yet. He does not take any medication for pain. He has a history of clavicle fracture many years ago.        The following portions of the patient's history were reviewed and updated as appropriate: allergies, current medications, past family history, past social history, past surgical history and problem list.        Social History     Socioeconomic History    Marital status: /Civil Union     Spouse name: Not on file    Number of children: Not on file    Years of education: Not on file    Highest education level: Not on file   Occupational History    Not on file   Tobacco Use    Smoking status: Never    Smokeless tobacco: Not on file   Vaping Use    Vaping status: Never Used   Substance and Sexual Activity    Alcohol use: Yes    Drug use: No    Sexual activity: Not on file   Other Topics Concern    Not on file   Social History Narrative    Not on file     Social Drivers of Health     Financial Resource Strain: Not on file   Food Insecurity: Not on file   Transportation Needs: Not on file   Physical Activity: Not on file   Stress: Not on file   Social Connections: Not on file   Intimate Partner Violence: Not on file   Housing Stability: Not on file     History reviewed. No pertinent past medical history.  History reviewed. No pertinent surgical history.  Allergies   Allergen Reactions    Oxycodone Itching     Current Outpatient Medications on File Prior to Visit   Medication Sig Dispense Refill    gabapentin (Neurontin) 100 mg capsule Take 1 capsule (100 mg total) by mouth 3 (three) times a day for 14 days 42 capsule 0    " "naproxen (EC NAPROSYN) 500 MG EC tablet Take 1 tablet (500 mg total) by mouth 2 (two) times a day with meals for 14 days 28 tablet 0    metoclopramide (REGLAN) 10 mg tablet Take 1 tablet (10 mg total) by mouth every 6 (six) hours 30 tablet 0    SUMAtriptan (IMITREX) 25 mg tablet Take 1 tablet (25 mg total) by mouth once as needed for migraine for up to 1 dose 10 tablet 0    topiramate (TOPAMAX) 25 mg sprinkle capsule Take 1 capsule (25 mg total) by mouth 2 (two) times a day for 30 days 60 capsule 0     No current facility-administered medications on file prior to visit.              Objective:    Review of Systems  Pertinent items are noted in HPI.  All other systems were reviewed and are negative.    Physical Exam  Ht 6' 2\" (1.88 m)   Wt 98.9 kg (218 lb)   BMI 27.99 kg/m²   Cons: Appears well.  No apparent distress.  Psych: Alert. Oriented x3.  Mood and affect normal.  Eyes: PERRLA, EOMI  Resp: Normal effort.  No audible wheezing or stridor.  CV: Palpable pulse.  No discernable arrhythmia.  No LE edema.  Lymph:  No palpable cervical, axillary, or inguinal lymphadenopathy.  Skin: Warm.  No palpable masses.  No visible lesions.  Neuro: Normal muscle tone.  Normal and symmetric DTR's.      Ortho Exam  1/5 left thumb APB  1/5 wrist extension, 5/5 wrist flexion  Able to achieve composite fist   5/5 bicep, 2/5 tricep  Shoulder ROM flexion 90 abduction 90 IR 45 ER 60      Procedures  Procedures  No Procedures performed today    Diagnostics, reviewed and taken today if performed as documented:  I have personally reviewed pertinent films in PACS.  X-ray left shoulder obtained 10/26/2024 demonstrates severe glenohumeral and AC joint osteoarthritis with evidence of previous distal clavicle fracture        Scribe Attestation      I,:  Judy Cote am acting as a scribe while in the presence of the attending physician.:       I,:  Harish Nguyễn DO personally performed the services described in this documentation    as " "scribed in my presence.:             Portions of the record may have been created with voice recognition software.  Occasional wrong word or \"sound a like\" substitutions may have occurred due to the inherent limitations of voice recognition software.  Read the chart carefully and recognize, using context, where substitutions have occurred.  "

## 2024-12-26 ENCOUNTER — HOSPITAL ENCOUNTER (OUTPATIENT)
Dept: RADIOLOGY | Age: 54
Discharge: HOME | End: 2024-12-26

## 2024-12-26 DIAGNOSIS — S14.3XXA INJURY OF LEFT BRACHIAL PLEXUS, INITIAL ENCOUNTER: ICD-10-CM

## 2024-12-26 DIAGNOSIS — S44.92XA NEURAPRAXIA OF LEFT UPPER EXTREMITY, INITIAL ENCOUNTER: ICD-10-CM

## 2024-12-26 DIAGNOSIS — M62.59 ATROPHY OF MUSCLE OF MULTIPLE SITES: ICD-10-CM

## 2024-12-26 PROCEDURE — 71550 MRI CHEST W/O DYE: CPT

## 2024-12-27 ENCOUNTER — HOSPITAL ENCOUNTER (OUTPATIENT)
Dept: NEUROLOGY | Facility: CLINIC | Age: 54
End: 2024-12-27

## 2024-12-27 DIAGNOSIS — S14.3XXA INJURY OF LEFT BRACHIAL PLEXUS, INITIAL ENCOUNTER: ICD-10-CM

## 2024-12-27 DIAGNOSIS — S44.92XA NEURAPRAXIA OF LEFT UPPER EXTREMITY, INITIAL ENCOUNTER: ICD-10-CM

## 2024-12-27 DIAGNOSIS — M62.59 ATROPHY OF MUSCLE OF MULTIPLE SITES: ICD-10-CM

## 2024-12-27 PROCEDURE — 95912 NRV CNDJ TEST 11-12 STUDIES: CPT

## 2024-12-27 PROCEDURE — 95886 MUSC TEST DONE W/N TEST COMP: CPT

## 2025-01-30 ENCOUNTER — OFFICE VISIT (OUTPATIENT)
Dept: NEUROSURGERY | Facility: CLINIC | Age: 55
End: 2025-01-30
Payer: OTHER MISCELLANEOUS

## 2025-01-30 VITALS
HEART RATE: 88 BPM | SYSTOLIC BLOOD PRESSURE: 150 MMHG | HEIGHT: 74 IN | DIASTOLIC BLOOD PRESSURE: 100 MMHG | OXYGEN SATURATION: 97 % | TEMPERATURE: 98.2 F | BODY MASS INDEX: 27.98 KG/M2 | WEIGHT: 218 LBS

## 2025-01-30 DIAGNOSIS — M62.59 ATROPHY OF MUSCLE OF MULTIPLE SITES: ICD-10-CM

## 2025-01-30 DIAGNOSIS — S14.3XXA INJURY OF LEFT BRACHIAL PLEXUS, INITIAL ENCOUNTER: ICD-10-CM

## 2025-01-30 DIAGNOSIS — S44.92XA NEURAPRAXIA OF LEFT UPPER EXTREMITY, INITIAL ENCOUNTER: ICD-10-CM

## 2025-01-30 PROCEDURE — 99243 OFF/OP CNSLTJ NEW/EST LOW 30: CPT | Performed by: PHYSICIAN ASSISTANT

## 2025-01-30 NOTE — PROGRESS NOTES
Name: Ana Rivas      : 1970      MRN: [id number]  Encounter Provider: Buddy Knight PA-C  Encounter Date: 2025   Encounter department: [facility]  :  Assessment & Plan  Injury of left brachial plexus, initial encounter    Orders:    Ambulatory Referral to Neurosurgery    Ambulatory referral to Spine & Pain Management; Future    Ambulatory Referral to Physical Therapy; Future    Ambulatory Referral to Occupational Therapy; Future    Patient is a 54 yrs old gentleman with insignificant PMHx referred by Orthopedics for evaluation of left arm weakness in the setting of brachial plexus injury.    Patient reports Hx of fall off a truck while his left hand was hanging on the wheel in October. Patient reports his LUE is weak and has difficulty holding objects,  weakness and partial left wrist drop , more so with difficulty extending his pinky and ring fingers. Patient also noticed shrinkage of triceps and biceps muscle bulk.    Patient reports no neck pain. He tried prednisolone and Gabapentin without much effect.    Patient denies Hx of DM, CHF, Stroke, seizures, bleeding disorder or taking anticoagulant meds. Denies smoking cigarettes.    MRI of left brachial plexus shows left brachial plexopathy. EMG test shows evidence of acute/subacute lower trunk left left brachial plexopathy, Cubital tunnel syndrome.  His Shoulder MRI doesn't show cervical spine pathology that is attributable to his left arm weakness and paresthesia.      Plan:  Ambulatory referral to PMX  Referral to PT and OT  Offered referral to [facility] for further eval/2nd opinion, but patient declined  Call with questions or concerns  Otherwise, follow up on PRN basis.    History of Present Illness   HPI-See detal discussion above  Review of Systems   Constitutional: Negative.    HENT: Negative.     Eyes: Negative.    Respiratory: Negative.     Cardiovascular: Negative.    Gastrointestinal:  Negative.    Endocrine: Negative.    Genitourinary: Negative.    Musculoskeletal:  Positive for neck pain and neck stiffness.        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    Allergic/Immunologic: Negative.    Neurological:  Positive for weakness (can't straighten out his left hand).   Hematological: Negative.    Psychiatric/Behavioral: Negative.     All other systems reviewed and are negative.   I have personally reviewed the MA's review of systems and made changes as necessary.     Objective   There were no vitals taken for this visit.    Physical Exam  Constitutional:       Appearance: Normal appearance.   HENT:      Head: Normocephalic and atraumatic.   Pulmonary:      Effort: Pulmonary effort is normal.   Musculoskeletal:         General: Tenderness present.   Neurological:      Mental Status: He is oriented to person, place, and time.      Sensory: Sensory deficit present.      Motor: Weakness present.      Deep Tendon Reflexes: Reflexes are normal and symmetric.   Psychiatric:         Speech: Speech normal.       Neurological Exam  Mental Status  Awake, alert and oriented to person, place and time. Oriented to person, place, time and situation. Oriented to person, place, and time. Recent and remote memory are intact. Speech is normal. Language is fluent with no aphasia. Attention and concentration are normal.    Motor  Normal muscle bulk throughout. No  fasciculations present. Normal muscle tone. No abnormal involuntary movements. Strength is 5/5 in all four extremities except as noted.  Left arm and forearm muscle baulk shrink, left pinky and ring fingers extension weakness, left  weakness including elbow flexion and extension, decreased left triceps strength..    Sensory  Light touch abnormality:     Reflexes  Deep tendon reflexes are 2+ and symmetric in all four extremities.    Right pathological reflexes: Nathaniel's absent. Ankle clonus absent.  Left pathological reflexes: Nathaniel's absent. Ankle clonus absent.    Coordination  Right: Finger-to-nose normal.Left: Finger-to-nose normal.      Radiology Results Review: I personally reviewed the following image studies in PACS and associated radiology reports: MRI brachial plexus . My interpretation of the radiology images/reports is: I reviewed the images findings with the patient and findings as described in the assessment section above.    Administrative Statements   I have spent a total time of 30 minutes in caring for this patient on the day of the visit/encounter including Diagnostic results, Prognosis, Risks and benefits of tx options, Instructions for management, Patient and family education, Importance of tx compliance, Risk factor reductions, Impressions, Documenting in the medical record, Reviewing / ordering tests, medicine, procedures  , and Obtaining or reviewing history  .

## 2025-01-30 NOTE — LETTER
2025     Harish Nguyễn DO  [address]  [address]  [facility] [place] [facility] [address]    Patient: Ana Rivas   YOB: 1970   Date of Visit: 2025       Dear Dr. Nguyễn:    Thank you for referring Ana Rivas to me for evaluation. Below are my notes for this consultation.    If you have questions, please do not hesitate to call me. I look forward to following your patient along with you.         Sincerely,        Buddy Knight PA-C        CC: No Recipients    Buddy Knight PA-C  2025  6:03 PM  Incomplete  Name: Ana Rivas      : 1970      MRN: [id number]  Encounter Provider: Buddy Knight PA-C  Encounter Date: 2025   Encounter department: [facility] NEUROSURGICAL [facility]  :  Assessment & Plan    Patient is a 54 yrs old gentleman with insignificant PMHx referred by Orthopedics for evaluation of left arm weakness in the setting of brachial plexus injury.    Patient reports Hx of fall off a truck while his left hand was hanging on the wheel in October. Patient reports his LUE is weak and has difficulty holding objects,  weakness and partial left wrist drop , more so with difficulty extending his pinky and ring fingers. Patient also noticed shrinkage of triceps and biceps muscle bulk.    Patient reports no neck pain. He tried prednisolone and Gabapentin without much effect.    Patient denies Hx of DM, CHF, Stroke, seizures, bleeding disorder or taking anticoagulant meds. Denies smoking cigarettes.    MRI of left brachial plexus shows left brachial plexopathy. EMG test shows evidence of acute/subacute lower trunk left left brachial plexopathy, Cubital tunnel syndrome.  His Shoulder MRI doesn't show cervical spine pathology that is attributable to his left arm weakness and paresthesia.      Plan:  Ambulatory referral to PMX  Referral to PT and OT  Offered referral to [facility] for further eval/2nd opinion.  Call with questions or concerns  Otherwise,  follow up on PRN basis.    History of Present Illness  HPI-See detal discussion above  Review of Systems   Constitutional: Negative.    HENT: Negative.     Eyes: Negative.    Respiratory: Negative.     Cardiovascular: Negative.    Gastrointestinal: Negative.    Endocrine: Negative.    Genitourinary: Negative.    Musculoskeletal:  Positive for neck pain and neck stiffness.        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    Allergic/Immunologic: Negative.    Neurological:  Positive for weakness (can't straighten out his left hand).   Hematological: Negative.    Psychiatric/Behavioral: Negative.     All other systems reviewed and are negative.   I have personally reviewed the MA's review of systems and made changes as necessary.     Objective  There were no vitals taken for this visit.    Physical Exam  Neurological Exam    Radiology Results Review: I personally reviewed the following image studies in PACS and associated radiology reports: MRI brachial plexus . My interpretation of the radiology images/reports is: I reviewed the images findings with the patient and findings as described in the assessment section above.    Administrative Statements  I have spent a total time of 30 minutes in caring for this patient on the day of the visit/encounter including Diagnostic results, Prognosis, Risks and benefits of tx options, Instructions for management, Patient and family  education, Importance of tx compliance, Risk factor reductions, Impressions, Documenting in the medical record, Reviewing / ordering tests, medicine, procedures  , and Obtaining or reviewing history  .     Buddy Knight PA-C  2025  5:30 PM  Sign when Signing Visit  Name: Ana Rivas      : 1970      MRN: [id number]  Encounter Provider: Buddy Knight PA-C  Encounter Date: 2025   Encounter department: [facility] NEUROSURGICAL [facility]  :  Assessment & Plan        History of Present Illness  HPI  Review of Systems   Constitutional: Negative.    HENT: Negative.     Eyes: Negative.    Respiratory: Negative.     Cardiovascular: Negative.    Gastrointestinal: Negative.    Endocrine: Negative.    Genitourinary: Negative.    Musculoskeletal:  Positive for neck pain and neck stiffness.        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    Allergic/Immunologic: Negative.    Neurological:  Positive for weakness (can't straighten out his left hand).   Hematological: Negative.    Psychiatric/Behavioral: Negative.     All other systems reviewed and are negative.   I have personally reviewed the MA's review of systems and made changes as necessary.         Objective  There were no vitals taken for this visit.    Physical Exam  Neurological Exam

## 2025-01-31 NOTE — ASSESSMENT & PLAN NOTE
Orders:    Ambulatory Referral to Neurosurgery    Ambulatory referral to Spine & Pain Management; Future    Ambulatory Referral to Physical Therapy; Future    Ambulatory Referral to Occupational Therapy; Future

## 2025-02-05 ENCOUNTER — TELEPHONE (OUTPATIENT)
Age: 55
End: 2025-02-05

## 2025-02-05 NOTE — TELEPHONE ENCOUNTER
PT CALLED TO CONFIRM NO ADDITIONAL F/U APPTS WITH  [facility] AT THIS TIME AND ASKED TO BE TRANSFERRED TO  PHYSICAL THERAPY TO FOLLOW UP ON REFERRALS PLACED AT LOV 1/30/2025 .    PT WAS APPRECIATIVE

## 2025-03-10 ENCOUNTER — OFFICE VISIT (OUTPATIENT)
Dept: FAMILY MEDICINE CLINIC | Facility: CLINIC | Age: 55
End: 2025-03-10

## 2025-03-10 VITALS
SYSTOLIC BLOOD PRESSURE: 144 MMHG | BODY MASS INDEX: 27.34 KG/M2 | DIASTOLIC BLOOD PRESSURE: 92 MMHG | WEIGHT: 213 LBS | HEART RATE: 81 BPM | TEMPERATURE: 98.5 F | OXYGEN SATURATION: 98 % | RESPIRATION RATE: 18 BRPM | HEIGHT: 74 IN

## 2025-03-10 DIAGNOSIS — I10 PRIMARY HYPERTENSION: ICD-10-CM

## 2025-03-10 DIAGNOSIS — Z11.59 NEED FOR HEPATITIS C SCREENING TEST: ICD-10-CM

## 2025-03-10 DIAGNOSIS — Z11.4 SCREENING FOR HIV (HUMAN IMMUNODEFICIENCY VIRUS): ICD-10-CM

## 2025-03-10 DIAGNOSIS — Z13.220 LIPID SCREENING: ICD-10-CM

## 2025-03-10 DIAGNOSIS — R53.1 WEAKNESS: Primary | ICD-10-CM

## 2025-03-10 DIAGNOSIS — R73.01 ELEVATED FASTING GLUCOSE: ICD-10-CM

## 2025-03-10 DIAGNOSIS — Z12.11 COLON CANCER SCREENING: ICD-10-CM

## 2025-03-10 PROCEDURE — 99203 OFFICE O/P NEW LOW 30 MIN: CPT

## 2025-03-10 RX ORDER — ADHESIVE BANDAGE 3/4"
BANDAGE TOPICAL DAILY
Qty: 1 EACH | Refills: 0 | Status: SHIPPED | OUTPATIENT
Start: 2025-03-10 | End: 2025-03-10

## 2025-03-10 NOTE — PROGRESS NOTES
Name: Ana Rivas      : 1970      MRN: [id number]  Encounter Provider: Bib Maya MD  Encounter Date: 3/10/2025   Encounter department: [facility] [place]  :  Assessment & Plan  Weakness   the patient was involved in an injury at work that resulted in jerking motion of his left arm.  Since then he has felt weakness, numbness, pain, and clumsiness.  He has developed poor coordination in the left arm, and visible muscle atrophy.  Orders:    Vitamin B12/Folate, Serum Panel; Future    Primary hypertension  Patient reports being diagnosed with hypertension in the past.  Today's blood pressure is elevated to the 140s over 90s confirmed on repeat.  Patient denies any hypertensive symptoms.  He has not been checking his pressures at home.  He would like to hold off on medications at this time    Record home blood pressures  Return to clinic in 4 weeks  Dietary and activity interventions  Check BMP  Consider initiation of medications when patient RTC  Orders:    Basic metabolic panel; Future    Lipid screening  Patient is due for lipid screening lipid screening ordered  Orders:    Lipid panel; Future    Screening for HIV (human immunodeficiency virus)  Patient is due for HIV screening HIV screening ordered  Orders:    HIV 1/2 AG/AB w Reflex [facility] for 2 yr old and above; Future    Need for hepatitis C screening test  Patient is due for hep C screening hep C screening ordered  Orders:    Hepatitis C antibody; Future    Colon cancer screening  Patient is due for colon cancer screening.  Discussed the risks and benefits of undergoing colonoscopy for screening purposes patient expressed understanding and agreement and was referred to gastroenterology  Orders:    Ambulatory Referral to Gastroenterology; Future    Elevated fasting glucose  Patient had a previous CMP with elevated fasting glucose.  Will check A1c.  Orders:    Hemoglobin A1C; Future           History of  "Present Illness   HPI  Ana Rivas is a 54 y.o. coming in for establishing care. He has concerns for his high blood pressure and largely concerned for his L arm injury at work 10/26. He was initially given a steroid taper that did not improve his symptoms. Since then he has had MRI and EMG that showed possible inflammation in the L brachial plexus.   Review of Systems   Constitutional:  Negative for chills and fever.   HENT:  Negative for ear pain and sore throat.    Eyes:  Negative for pain and visual disturbance.   Respiratory:  Negative for cough and shortness of breath.    Cardiovascular:  Negative for chest pain and palpitations.   Gastrointestinal:  Negative for abdominal pain and vomiting.   Genitourinary:  Negative for dysuria and hematuria.   Musculoskeletal:  Negative for arthralgias and back pain.   Skin:  Negative for color change and rash.   Neurological:  Positive for weakness. Negative for seizures and syncope.   All other systems reviewed and are negative.      Objective   /92 (BP Location: Left arm, Patient Position: Sitting, Cuff Size: Standard)   Pulse 81   Temp 98.5 °F (36.9 °C) (Temporal)   Resp 18   Ht 6' 2\" (1.88 m)   Wt 96.6 kg (213 lb)   SpO2 98%   BMI 27.35 kg/m²      Physical Exam  Vitals and nursing note reviewed.   Constitutional:       General: He is not in acute distress.     Appearance: He is well-developed.   HENT:      Head: Normocephalic and atraumatic.   Eyes:      Conjunctiva/sclera: Conjunctivae normal.   Cardiovascular:      Rate and Rhythm: Normal rate and regular rhythm.      Heart sounds: No murmur heard.  Pulmonary:      Effort: Pulmonary effort is normal. No respiratory distress.      Breath sounds: Normal breath sounds.   Abdominal:      Palpations: Abdomen is soft.      Tenderness: There is no abdominal tenderness.   Musculoskeletal:         General: No swelling.      Cervical back: Neck supple.      Comments: LUE atrophy   Skin:     General: Skin is " warm and dry.      Capillary Refill: Capillary refill takes less than 2 seconds.   Neurological:      Mental Status: He is alert.   Psychiatric:         Mood and Affect: Mood normal.

## 2025-03-10 NOTE — LETTER
March 10, 2025     Patient: Ana Rivas  YOB: 1970  Date of Visit: 3/10/2025      To Whom it May Concern:    Ana Rivas is under my professional care. Ana was seen in my office on 3/10/2025. Ana may return to work on 3/11/2025 . His absence on 3/10/2025 from work was medically necessary.    If you have any questions or concerns, please don't hesitate to call.         Sincerely,          Bib Maya MD        CC: No Recipients

## 2025-03-20 ENCOUNTER — TELEPHONE (OUTPATIENT)
Age: 55
End: 2025-03-20

## 2025-03-20 ENCOUNTER — PREP FOR PROCEDURE (OUTPATIENT)
Age: 55
End: 2025-03-20

## 2025-03-20 DIAGNOSIS — Z12.11 SCREENING FOR COLON CANCER: Primary | ICD-10-CM

## 2025-03-20 NOTE — TELEPHONE ENCOUNTER
Anjali from [facility] called in behalf of pt to schedule colonoscopy . Pt would c/b tomorrow to confirm what date he is available . Order was placed in .

## 2025-03-20 NOTE — TELEPHONE ENCOUNTER
03/20/25  Screened by: Ashlie Mosley     Referring Provider Dr. Bib Maya     Pre- Screening:      Body mass index is 27.35 kg/m².  Has patient been referred for a routine screening Colonoscopy? yes  Is the patient between 45-75 years old? yes        Previous Colonoscopy no   If yes:    Date:     Facility:     Reason:            Does the patient want to see a Gastroenterologist prior to their procedure OR are they having any GI symptoms? no     Has the patient been hospitalized or had abdominal surgery in the past 6 months? no     Does the patient use supplemental oxygen? no     Does the patient take Coumadin, Lovenox, Plavix, Elliquis, Xarelto, or other blood thinning medication? no     Has the patient had a stroke, cardiac event, or stent placed in the past year? no          If patient is between 45yrs - 49yrs, please advise patient that we will have to confirm benefits & coverage with their insurance company for a routine screening colonoscopy.

## 2025-04-02 ENCOUNTER — TELEPHONE (OUTPATIENT)
Dept: FAMILY MEDICINE CLINIC | Facility: CLINIC | Age: 55
End: 2025-04-02

## 2025-04-02 NOTE — TELEPHONE ENCOUNTER
Called patient to remind him he have an appointment on Friday 4/4/25 and there blood work needs to be completed. Per patient he hasn't be able to complete blood work due to work schedule. Patient also state he hasn't written down his blood pressure readings but  he is doing fine. Per patient he change his eating habits.

## 2025-04-04 ENCOUNTER — OFFICE VISIT (OUTPATIENT)
Dept: FAMILY MEDICINE CLINIC | Facility: CLINIC | Age: 55
End: 2025-04-04

## 2025-04-04 VITALS
OXYGEN SATURATION: 98 % | RESPIRATION RATE: 18 BRPM | WEIGHT: 215 LBS | HEIGHT: 74 IN | HEART RATE: 85 BPM | TEMPERATURE: 98.8 F | SYSTOLIC BLOOD PRESSURE: 155 MMHG | DIASTOLIC BLOOD PRESSURE: 104 MMHG | BODY MASS INDEX: 27.59 KG/M2

## 2025-04-04 DIAGNOSIS — H52.03 HYPEROPIA OF BOTH EYES: ICD-10-CM

## 2025-04-04 DIAGNOSIS — R53.1 WEAKNESS: ICD-10-CM

## 2025-04-04 DIAGNOSIS — I10 PRIMARY HYPERTENSION: Primary | ICD-10-CM

## 2025-04-04 PROCEDURE — 99213 OFFICE O/P EST LOW 20 MIN: CPT | Performed by: FAMILY MEDICINE

## 2025-04-04 RX ORDER — LISINOPRIL 10 MG/1
10 TABLET ORAL DAILY
Qty: 90 TABLET | Refills: 2 | Status: SHIPPED | OUTPATIENT
Start: 2025-04-04

## 2025-04-04 NOTE — PROGRESS NOTES
Name: Ana Rivas      : 1970      MRN: [id number]  Encounter Provider: Bib Maya MD  Encounter Date: 2025   Encounter department: [facility] [place]  :  Assessment & Plan  Primary hypertension  Worsened from prior.  Patient reports that he has not been taking his blood pressure at home and has not been taking any medication.  He was also not able to complete his labs.      Reminded patient to do his labs  Start Lisinopril 10mg daily  Orders:    lisinopril (ZESTRIL) 10 mg tablet; Take 1 tablet (10 mg total) by mouth daily    Weakness  Weakness in the left arm from work-related traction injury in October has not resolved.  Patient appears to be developing decreased passive range of motion with a left wrist and fingers.  He had prior positive EMG and MRI concerning for brachial plexus neuritis.  He has missed his appointments with neurosurgery and had difficulty rescheduling.    Plan  Passive range of motion in the affected arm at home  Referral to PT  Referral to neurosurgery  Orders:    Ambulatory Referral to Physical Therapy; Future    Ambulatory Referral to Neurosurgery; Future    Hyperopia of both eyes  Patient reports having trouble reading things that are close to him.    By reading glasses  Referral to ophthalmology for workup and annual exam which she is overdue for  Orders:    Ambulatory Referral to Ophthalmology; Future           History of Present Illness   HPI  Ana Rivas is a 54 y.o. M presenting for HTN follow up. He has been having trouble keeping up with his logs and has not been taking his medication. His  L arm weakness continues to be problematic  Review of Systems   Constitutional:  Negative for fever.   Respiratory:  Negative for shortness of breath.    Cardiovascular:  Negative for chest pain.   Gastrointestinal:  Negative for abdominal pain.   Neurological:  Positive for weakness.       Objective   BP (!) 155/104   Pulse 85   Temp  "98.8 °F (37.1 °C) (Temporal)   Resp 18   Ht 6' 2\" (1.88 m)   Wt 97.5 kg (215 lb)   SpO2 98%   BMI 27.60 kg/m²      Physical Exam  Vitals and nursing note reviewed.   Constitutional:       General: He is not in acute distress.     Appearance: He is well-developed.   HENT:      Head: Normocephalic and atraumatic.   Eyes:      Conjunctiva/sclera: Conjunctivae normal.   Cardiovascular:      Rate and Rhythm: Normal rate and regular rhythm.      Heart sounds: No murmur heard.  Pulmonary:      Effort: Pulmonary effort is normal. No respiratory distress.      Breath sounds: Normal breath sounds.   Abdominal:      Palpations: Abdomen is soft.      Tenderness: There is no abdominal tenderness.   Musculoskeletal:         General: No swelling.      Cervical back: Neck supple.   Skin:     General: Skin is warm and dry.      Capillary Refill: Capillary refill takes less than 2 seconds.   Neurological:      Mental Status: He is alert.   Psychiatric:         Mood and Affect: Mood normal.         "

## 2025-04-04 NOTE — PATIENT INSTRUCTIONS
Set up [employer]   and take Lisinopril 10mg daily. Set up alarm  Check blood pressure daily  Set up PT  Reschedule with neurosurgery

## 2025-04-10 ENCOUNTER — PREP FOR PROCEDURE (OUTPATIENT)
Age: 55
End: 2025-04-10

## 2025-04-10 ENCOUNTER — TELEPHONE (OUTPATIENT)
Age: 55
End: 2025-04-10

## 2025-04-10 NOTE — TELEPHONE ENCOUNTER
Scheduled date of colonoscopy (as of today): 7/18/25  Physician performing colonoscopy: Dr. Prakash  [facility] of colonoscopy[facility]  Bowel prep reviewed with patient: Miralax / Dulcolax, diabetic, screening vs diagnostic sent via email added today to the pt   Instructions reviewed with patient by: Willie  Clearances:  N/A    Advised pt to bring insurance ID cards and copayment if required by insurance

## 2025-04-10 NOTE — TELEPHONE ENCOUNTER
"04/10/25  Screened by: Willie Cárdenas    Referring Provider Francesco    Pre- Screening:     There is no height or weight on file to calculate BMI.  H 6' 2\" Wt 215 lbs   BMI 27.6    Has patient been referred for a routine screening Colonoscopy? yes  Is the patient between 45-75 years old? yes    Previous Colonoscopy no   If yes:    Date:     Facility:     Reason:     Does the patient want to see a Gastroenterologist prior to their procedure OR are they having any GI symptoms? no    Has the patient been hospitalized or had abdominal surgery in the past 6 months? no    Does the patient use supplemental oxygen? no    Does the patient take Coumadin, Lovenox, Plavix, Elliquis, Xarelto, or other blood thinning medication? no    Has the patient had a stroke, cardiac event, or stent placed in the past year? no    If patient is between 45yrs - 49yrs, please advise patient that we will have to confirm benefits & coverage with their insurance company for a routine screening colonoscopy.    "

## 2025-04-21 ENCOUNTER — OFFICE VISIT (OUTPATIENT)
Dept: NEUROSURGERY | Facility: CLINIC | Age: 55
End: 2025-04-21
Payer: COMMERCIAL

## 2025-04-21 VITALS
WEIGHT: 215 LBS | SYSTOLIC BLOOD PRESSURE: 146 MMHG | TEMPERATURE: 98.1 F | BODY MASS INDEX: 29.12 KG/M2 | OXYGEN SATURATION: 99 % | DIASTOLIC BLOOD PRESSURE: 100 MMHG | HEART RATE: 99 BPM | HEIGHT: 72 IN

## 2025-04-21 DIAGNOSIS — G54.0 BRACHIAL PLEXOPATHY: Primary | ICD-10-CM

## 2025-04-21 DIAGNOSIS — R53.1 WEAKNESS: ICD-10-CM

## 2025-04-21 PROCEDURE — 99213 OFFICE O/P EST LOW 20 MIN: CPT | Performed by: PHYSICIAN ASSISTANT

## 2025-04-21 NOTE — PROGRESS NOTES
Name: Ana Rivas      : 1970      MRN: [id number]  Encounter Provider: Buddy Knight PA-C  Encounter Date: 2025   Encounter department: [facility]  :  Assessment & Plan  Brachial plexopathy  Patient is a 54 yrs old gentleman with insignificant PMHx initially referred by Orthopedics for evaluation of left arm weakness in the setting of brachial plexus injury.     Patient reports Hx of fall off a truck while his left hand was hanging on the wheel in October. Patient continues experiencing weakness in  his LUE with difficulty holding/dropping objects, and partial left wrist drop, specifically extension weakness with his pinky and ring fingers, to some extent the fingers patient also noticed decreased strength and muscle bulk in the triceps, biceps and brachioradialis.  Some paresthesia and numbness in his pinky and ring fingers ulnar side of left hand.  Has occasional shooting pain in his armpit down to the left upper extremity along the nerve     Patient reports no neck pain. He tried prednisolone and Gabapentin without much effect.      Patient denies Hx of DM, CHF, Stroke, seizures, bleeding disorder or taking anticoagulant meds. Denies smoking cigarettes.    Imagings:      MRI of left brachial plexus shows left brachial plexopathy. EMG test shows evidence of acute/subacute lower trunk left left brachial plexopathy, Cubital tunnel syndrome.    His Shoulder MRI doesn't show cervical spine pathology that is attributable to his left arm weakness and paresthesia.  Patient was offered referral to [facility] when he was seen initially, bu he declined. I referred him to PT/OT , he says there is time conflict of his working hours with PT/OT schedules. However, he continuous doing some hand exercise at home.    Plan:     I reviewed his MRI and EMG test results, I discussed that his left arm weakness could be from left brachial plexopathy and additionally left cubital syndrome,  or both. There is no guarantee that any left Cubital tunnel release procedure would improve his symptoms.    I would refer patient to Dr Ritter for re-eval of the and give his opinion about EMG findings of Left Cubital Tunnel Ulnar nerve entrapment. I advised the patient to continue working on his hand to strengthen his muscles.    All questions and concerns were answered to patient's satisfaction, patient expressed his understandings and agreed with the plan.    Call with questions or concerns, otherwise follow up on PRN basis.      Orders:    Ambulatory Referral to Neurosurgery; Future      History of Present Illness     Ana Rivas is a 54 y.o. male here today for continuous weakness in the LUE.    HPI   See discussion above  Review of Systems   Constitutional: Negative.    HENT: Negative.     Eyes: Negative.    Respiratory: Negative.     Cardiovascular: Negative.    Gastrointestinal: Negative.    Endocrine: Negative.    Genitourinary: Negative.    Musculoskeletal: Negative.         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  Difficulty moving his fingers on left   + weakness on (L) hand   Meds; Gabapentin, Naproxen   No AC/ non-smoker     Skin: Negative.    Allergic/Immunologic: Negative.    Neurological:  Positive for weakness.   Hematological: Negative.    Psychiatric/Behavioral: Negative.     All other systems reviewed and are negative.    I have personally reviewed the MA's review of systems and made changes as necessary.    Past Medical History   No past medical history on file.  No past surgical history on file.  No family history on file.  he reports that he has never smoked. He does not have any smokeless tobacco history on file. He reports current alcohol use. He reports that he does not use drugs.  Current Outpatient Medications    Medication Instructions    gabapentin (NEURONTIN) 100 mg, Oral, 3 times daily    lisinopril (ZESTRIL) 10 mg, Oral, Daily    metoclopramide (REGLAN) 10 mg, Oral, Every 6 hours    naproxen (EC NAPROSYN) 500 mg, Oral, 2 times daily with meals    SUMAtriptan (IMITREX) 25 mg, Oral, Once as needed    topiramate (TOPAMAX) 25 mg, Oral, 2 times daily     Allergies   Allergen Reactions    Oxycodone Itching      Objective   There were no vitals taken for this visit.    Physical Exam  Constitutional:       Appearance: Normal appearance.   HENT:      Head: Normocephalic and atraumatic.   Pulmonary:      Effort: Pulmonary effort is normal.   Musculoskeletal:         General: Normal range of motion.      Cervical back: Normal range of motion.   Neurological:      Mental Status: He is alert.      Sensory: Sensory deficit present.      Motor: Weakness present.      Deep Tendon Reflexes:      Reflex Scores:       Tricep reflexes are 2+ on the right side and 1+ on the left side.       Bicep reflexes are 2+ on the right side and 1+ on the left side.       Brachioradialis reflexes are 2+ on the right side and 1+ on the left side.       Patellar reflexes are 2+ on the right side and 2+ on the left side.       Achilles reflexes are 2+ on the right side and 2+ on the left side.      Neurological Exam  Mental Status  Alert.    Motor  Decreased muscle bulk throughout. Left Triceps, biceps and brachioradialis partially and some of the L  hand muscles. No fasciculations present. Decreased muscle tone. No abnormal involuntary movements. Strength is 5/5 in all four extremities except as noted.  Left  partially weak on the ulnar side, weakness also detected in the left triceps and also biceps..    Sensory  Left: Loss of sensation in the C5, C8, C7 and C6 dermatome.    Reflexes                                            Right                      Left  Brachioradialis                    2+                         1+  Biceps                                  2+                         1+  Triceps                                2+                         1+  Patellar                                2+                         2+  Achilles                                2+                         2+    Right pathological reflexes: Nathaniel's absent.  Left pathological reflexes: Nathaniel's absent.    Coordination  Right: Finger-to-nose normal.    Gait  Casual gait is normal including stance, stride, and arm swing.      Radiology Results Review: I personally reviewed the following image studies in PACS and associated radiology reports: MRI spine. My interpretation of the radiology images/reports is: See discussion above.

## 2025-05-08 ENCOUNTER — OFFICE VISIT (OUTPATIENT)
Dept: NEUROSURGERY | Facility: CLINIC | Age: 55
End: 2025-05-08
Payer: COMMERCIAL

## 2025-05-08 VITALS
WEIGHT: 215 LBS | BODY MASS INDEX: 27.59 KG/M2 | OXYGEN SATURATION: 97 % | DIASTOLIC BLOOD PRESSURE: 90 MMHG | SYSTOLIC BLOOD PRESSURE: 140 MMHG | HEIGHT: 74 IN | HEART RATE: 79 BPM | TEMPERATURE: 98 F

## 2025-05-08 DIAGNOSIS — R53.1 WEAKNESS: ICD-10-CM

## 2025-05-08 DIAGNOSIS — G56.22 CUBITAL TUNNEL SYNDROME, LEFT: Primary | ICD-10-CM

## 2025-05-08 DIAGNOSIS — G54.0 BRACHIAL PLEXOPATHY: ICD-10-CM

## 2025-05-08 PROCEDURE — 99214 OFFICE O/P EST MOD 30 MIN: CPT | Performed by: NEUROLOGICAL SURGERY

## 2025-05-08 NOTE — PROGRESS NOTES
Name: Ana Rivas      : 1970      MRN: [id number]  Encounter Provider: Irwin Dominguez MD  Encounter Date: 2025   Encounter department: [facility] [place]  :  Assessment & Plan  Brachial plexopathy  There is a left lower trunk brachial plexopathy which is noted on the MRI in terms of high signal and on the EMG nerve conduction study.  There is a suggestion of a radiculopathy which I believed to be unlikely.  There is no CSF leak from an avulsion injury.  The pain has improved tremendously from its initial intensity however his power is only slowly improving.  Specific and vigorous physical therapy is the mainstay of treatment for this.  I would recommend doing an ulnar nerve decompression by doing this only after he has had time to recover from the plexopathy  Orders:    Ambulatory Referral to Neurosurgery    Ambulatory referral to Physical Therapy; Future    Weakness  Secondary to above plus the ulnar nerve injury  Orders:    Ambulatory Referral to Neurosurgery    Ambulatory referral to Physical Therapy; Future    Cubital tunnel syndrome, left  I have recommended doing this at approximately 12 months following the incident.  Orders:    Ambulatory referral to Physical Therapy; Future        History of Present Illness     Ana Rivas is a 54 y.o. 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    He fell from the truck when he missed a step and held onto the steering wheel with his left arm alone.    His body was restrained by his outstretched left arm which twisted.  He had immediate pain and weakness in the left upper extremity.    For the next several weeks he had severe pain which prompted him to visit in emergency department.  He was diagnosed with shoulder arthropathy alone.    Eventually he was given some steroids and gabapentin which helped minimally.    Pain in neck  "radiating to the left wrist and hands on the left  No PT  No EDSI  He cannot use his left hand in a functional way.  He scratches and misplaces his fingers when attempting to wash his face with his left hand.  He cannot use his left hand to get an item out of his left pocket.  He has not done physical therapy and there has been some improvement in pain but minimal improvement in power               Review of Systems   Musculoskeletal:         2025 - c/o: Pain in Lt wrist, forearm into chest; Difficulty moving fingers on Lt and W in Lt hand.   Imaging: MRI brachial plexus wo con 2024  PT: No recent PT  [place]: No recent [place]  SX: No recent SX  [id number]  AntiCoag: No AC  Smoker: Never   Neurological:  Positive for weakness.   All other systems reviewed and are negative.    I have personally reviewed the MA's review of systems and made changes as necessary.    Past Medical History   History reviewed. No pertinent past medical history.  History reviewed. No pertinent surgical history.  Family History   Problem Relation Age of Onset    Hypertension Father      he reports that he has never smoked. He does not have any smokeless tobacco history on file. He reports current alcohol use. He reports that he does not use drugs.  Current Outpatient Medications   Medication Instructions    gabapentin (NEURONTIN) 100 mg, Oral, 3 times daily    lisinopril (ZESTRIL) 10 mg, Oral, Daily    metoclopramide (REGLAN) 10 mg, Oral, Every 6 hours    naproxen (EC NAPROSYN) 500 mg, Oral, 2 times daily with meals    SUMAtriptan (IMITREX) 25 mg, Oral, Once as needed    topiramate (TOPAMAX) 25 mg, Oral, 2 times daily     Allergies   Allergen Reactions    Oxycodone Itching      Objective   /90 (BP Location: Right arm, Patient Position: Sitting, Cuff Size: Adult)   Pulse 79   Temp 98 °F (36.7 °C) (Temporal)   Ht 6' 2\" (1.88 m)   Wt 97.5 kg (215 lb)   SpO2 97%   BMI 27.60 kg/m²     Physical Exam  Vitals and nursing note " reviewed.   Constitutional:       General: He is not in acute distress.     Appearance: Normal appearance. He is normal weight. He is not ill-appearing, toxic-appearing or diaphoretic.   HENT:      Head: Normocephalic and atraumatic.      Nose: Nose normal.   Eyes:      Extraocular Movements: Extraocular movements intact.      Pupils: Pupils are equal, round, and reactive to light.   Musculoskeletal:         General: No swelling, tenderness, deformity or signs of injury. Normal range of motion.      Cervical back: Normal range of motion and neck supple. No rigidity or tenderness.      Right lower leg: No edema.      Left lower leg: No edema.      Comments: Pain to palpation over the ulnar groove   Skin:     General: Skin is warm and dry.   Neurological:      General: No focal deficit present.      Mental Status: He is alert and oriented to person, place, and time. Mental status is at baseline.      Cranial Nerves: No cranial nerve deficit.      Sensory: Sensory deficit (Numbness and tingling in the 5th and 4th digits on the left hand at nearly 50% that which is present on the right hand.  The thumbs and 2nd and 3rd digits are all at approximately normal sensation) present.      Motor: Weakness (He has tremendous weakness of the entire left upper extremity.  The power of his bicep is preserved at 4+ out of 5, his tricep is flaccid his pectoralis is at 3 out of 5 intrinsics of his hand are at 2 out of 5) present.      Coordination: Coordination normal.      Gait: Gait ( ) normal.      Deep Tendon Reflexes: Reflexes normal.      Comments: He cannot touch his fifth digit to his thumb     Psychiatric:         Mood and Affect: Mood normal.         Behavior: Behavior normal.         Thought Content: Thought content normal.         Judgment: Judgment normal.       Neurological Exam  Mental Status  Alert. Oriented to person, place, and time.    Cranial Nerves  CN III, IV, VI: Extraocular movements intact bilaterally. Pupils  equal round and reactive to light bilaterally.    Gait   Normal gait ( ).  He cannot touch his fifth digit to his thumb  .      Radiology Results Review: I personally reviewed the following image studies in PACS and associated radiology reports: MRI of the brachial plexus as well as EMG nerve conduction study of the left upper extremity.. My interpretation of the radiology images/reports is: The MRI of the brachial plexus shows no avulsion injury and no CSF signal in the neuroforamen.  There is minimal foraminal stenosis.  There is increased signal along the brachial plexus especially in his inferior trunk.  An EMG nerve conduction study demonstrates an inferior trunk plexopathy as well as an ulnar neuropathy secondary to compression at the elbow..

## 2025-05-23 ENCOUNTER — OFFICE VISIT (OUTPATIENT)
Dept: FAMILY MEDICINE CLINIC | Facility: CLINIC | Age: 55
End: 2025-05-23

## 2025-05-23 VITALS
WEIGHT: 209.6 LBS | SYSTOLIC BLOOD PRESSURE: 116 MMHG | DIASTOLIC BLOOD PRESSURE: 75 MMHG | OXYGEN SATURATION: 98 % | RESPIRATION RATE: 18 BRPM | BODY MASS INDEX: 26.91 KG/M2 | TEMPERATURE: 98.2 F | HEART RATE: 89 BPM

## 2025-05-23 DIAGNOSIS — I10 PRIMARY HYPERTENSION: ICD-10-CM

## 2025-05-23 DIAGNOSIS — S14.3XXD INJURY OF LEFT BRACHIAL PLEXUS, SUBSEQUENT ENCOUNTER: Primary | ICD-10-CM

## 2025-05-23 NOTE — ASSESSMENT & PLAN NOTE
Patient has significant left arm atrophy and weakness as a result of left arm brachial plexopathy that occurred from a hyperextension injury while he was at work and he was holding onto an item when it jerked his arm away from him.  Since then he has had difficulty controlling the arm with significant weakness and notable muscle atrophy.  He underwent an MRI that was non-specific for brachial plexopathy.  His muscle atrophy and poor coordination in the left arm seem more consistent with a neurologic injury related to the hyperextension of his shoulder and damage to the brachial plexus as opposed to related to arthritis which would be unlikely to cause atrophy, clumsiness and loss of strength throughout the arm. Patient has missed his neursurgery and PT appointments due to work as he works 14+ hour days 5-6 days a week.    Plan  Reschedule with Neurosurgery  Reschedule with PT  Complete lab work

## 2025-05-23 NOTE — ASSESSMENT & PLAN NOTE
Blood Pressure: 116/75 blood pressure today is well-controlled patient attributes this to improving his diet and not buying food out anymore.  He states he has been cooking at home more and using less salt.  He is taking his lisinopril 10 mg but says he usually only takes it about 2 times per week.  He has not been checking his blood pressure at home but says that he feels better and has not been having the same sensation that he gets when his blood pressure is high as frequently.    Plan  Encourage patient to take his blood pressure at home more frequently hopefully daily  If home blood pressures are elevated resume lisinopril  Complete lab work ordered at last visit ASAP  Encourage patient to continue low-salt diet

## 2025-05-23 NOTE — PATIENT INSTRUCTIONS
Please call and reschedule your Physical therapy and Neurosurgery appointments. Try to record your blood pressure daily and bring the records to your next appointment. Keep up the good work on your diet!

## 2025-05-23 NOTE — PROGRESS NOTES
Name: Ana Rivas      : 1970      MRN: [id number]  Encounter Provider: Bib Maya MD  Encounter Date: 2025   Encounter department: [facility] [place]  :  Assessment & Plan  Injury of left brachial plexus, subsequent encounter  Patient has significant left arm atrophy and weakness as a result of left arm brachial plexopathy that occurred from a hyperextension injury while he was at work and he was holding onto an item when it jerked his arm away from him.  Since then he has had difficulty controlling the arm with significant weakness and notable muscle atrophy.  He underwent an MRI that was non-specific for brachial plexopathy.  His muscle atrophy and poor coordination in the left arm seem more consistent with a neurologic injury related to the hyperextension of his shoulder and damage to the brachial plexus as opposed to related to arthritis which would be unlikely to cause atrophy, clumsiness and loss of strength throughout the arm. Patient has missed his neursurgery and PT appointments due to work as he works 14+ hour days 5-6 days a week.    Plan  Reschedule with Neurosurgery  Reschedule with PT  Complete lab work           Primary hypertension  Blood Pressure: 116/75 blood pressure today is well-controlled patient attributes this to improving his diet and not buying food out anymore.  He states he has been cooking at home more and using less salt.  He is taking his lisinopril 10 mg but says he usually only takes it about 2 times per week.  He has not been checking his blood pressure at home but says that he feels better and has not been having the same sensation that he gets when his blood pressure is high as frequently.    Plan  Encourage patient to take his blood pressure at home more frequently hopefully daily  If home blood pressures are elevated resume lisinopril  Complete lab work ordered at last visit ASAP  Encourage patient to continue low-salt  diet                History of Present Illness   HPI  Ana Rivas is a 54 y.o. M presenting for BP follow up. His BP is under control. His L arm is still very weak. He missed his appointment with NSGY. He will reschedule  Review of Systems   Constitutional:  Negative for fever.   Respiratory:  Negative for shortness of breath.    Cardiovascular:  Negative for chest pain.   Gastrointestinal:  Negative for abdominal pain.   Neurological:  Positive for weakness.       Objective   /75 (BP Location: Right arm, Patient Position: Sitting, Cuff Size: Standard)   Pulse 89   Temp 98.2 °F (36.8 °C) (Temporal)   Resp 18   Wt 95.1 kg (209 lb 9.6 oz)   SpO2 98%   BMI 26.91 kg/m²      Physical Exam  Vitals and nursing note reviewed.   Constitutional:       General: He is not in acute distress.     Appearance: He is well-developed.   HENT:      Head: Normocephalic and atraumatic.     Eyes:      Conjunctiva/sclera: Conjunctivae normal.       Cardiovascular:      Rate and Rhythm: Normal rate and regular rhythm.      Heart sounds: No murmur heard.  Pulmonary:      Effort: Pulmonary effort is normal. No respiratory distress.      Breath sounds: Normal breath sounds.   Abdominal:      Palpations: Abdomen is soft.      Tenderness: There is no abdominal tenderness.     Musculoskeletal:         General: Deformity present. No swelling.      Cervical back: Neck supple.      Comments: L arm has global atrophy from the biceps and deltoids to the intrinsic muscles of the hand. Poor coordination in that side and fine motor control. Decreased strength throughout the arm including  strength, wrist strength, finger fanning, flexion, extension, and opposition.     Skin:     General: Skin is warm and dry.      Capillary Refill: Capillary refill takes less than 2 seconds.     Neurological:      Mental Status: He is alert.     Psychiatric:         Mood and Affect: Mood normal.

## 2025-06-13 ENCOUNTER — EVALUATION (OUTPATIENT)
Dept: PHYSICAL THERAPY | Facility: CLINIC | Age: 55
End: 2025-06-13
Attending: NEUROLOGICAL SURGERY
Payer: COMMERCIAL

## 2025-06-13 DIAGNOSIS — G54.0 BRACHIAL PLEXOPATHY: Primary | ICD-10-CM

## 2025-06-13 PROCEDURE — 97161 PT EVAL LOW COMPLEX 20 MIN: CPT | Performed by: PHYSICAL THERAPIST

## 2025-06-13 PROCEDURE — 97110 THERAPEUTIC EXERCISES: CPT | Performed by: PHYSICAL THERAPIST

## 2025-06-13 PROCEDURE — 97112 NEUROMUSCULAR REEDUCATION: CPT | Performed by: PHYSICAL THERAPIST

## 2025-06-13 NOTE — HOME EXERCISE EDUCATION
Program_ID:[id number]   Access Code: [email or web address]  URL: [email or web address]  Date: 06-  Prepared By: Lazaro Leary    Program Notes      Exercises      - Seated Shoulder Flexion Towel Slide at Table Top -  x daily -  x weekly -  sets - 20 reps - 5 sec hold      - Standing Shoulder Row with Anchored Resistance -  x daily -  x weekly -  sets - 20 reps      - Shoulder extension with resistance - Neutral -  x daily -  x weekly -  sets - 20 reps      - Supine Shoulder Flexion Extension AAROM with Dowel -  x daily -  x weekly -  sets - 20 reps - 5 sec hold      - Prone Shoulder Horizontal Abduction -  x daily -  x weekly -  sets - 20 reps

## 2025-06-13 NOTE — PROGRESS NOTES
PT Evaluation     Today's date: 2025  Patient name: Ana Rivas  : 1970  MRN: [id number]  Referring provider: Irwin Dominguez,*  Dx:   Encounter Diagnosis     ICD-10-CM    1. Brachial plexopathy  G54.0                      Assessment  Impairments: abnormal or restricted ROM, abnormal movement, activity intolerance, impaired physical strength, lacks appropriate home exercise program and pain with function  Symptom irritability: moderate    Assessment details: Ana Rivas is a pleasant 54 y.o. male who presents with brachial plexus injury sustained in a work place accident that occurred on 10/26/25. Patient has had MRI and EMG studies performed that confirmed brachial plexus injury. Upon physical exam there are no sensation deficits to light touch. There is global weakness throughout the entire LUE. Focal weakness is noted specifically in the distribution of the ulnar and radial nerve, which is consistent with EMG study. Shoulder PROM deficit seems to be affected by both soft tissue restriction, due to lack of use, and adverse neural tension. Primary movement impairment diagnosis of LUE weakness, adverse neural tension, shoulder hypomobility. I discussed expected healing time line for this type of nerve injury. I do believe that the presence of a muscle contraction in all tested areas is a positive prognostic factor for his ability to make progress with physical therapy. I recommended that the patient utilize the hand therapy referral in the system to evaluate his L hand, which seems to be causing him the most functional limitation. I did provide patient with an initial HEP to begin ROM and basic strengthening of the proximal LUE. Pt. will benefit from skilled PT services to help return patient to status at [facility].             Understanding of Dx/Px/POC: good     Prognosis: good    Goals  Impairment based goals  Patient will achieve full shoulder abduction AROM.    Patient will achieve full  shoulder flexion AROM.   Patient will achieve 4/5 shoulder girdle MMT.   Patient will achieve 4/5 elbow flexion/extension MMT.     Function based goals  Patient will be independent in comprehensive [facility] upon discharge.  Patient will achieve goal FOTO score upon discharge.  Patient will return to all work related tasks without limitation.   Patient will return to all self care activities without limitation.     Plan  Patient would benefit from: skilled physical therapy    Planned therapy interventions: activity modification, manual therapy, motor coordination training, neuromuscular re-education, patient education, self care, therapeutic activities, therapeutic exercise, graded activity, home exercise program, graded exercise, functional ROM exercises and strengthening    Frequency: 2x week  Duration in weeks: 12  Plan of Care expiration date: 9/5/2025  Treatment plan discussed with: patient        Subjective    HPI: Patient sustained a L arm hyperextension injury on 10/26/24. This was an injury that occurred while at work. 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 Initially treated with a course of oral steroid that did not help. He has had MRI and EMG studies that are listed in Epic that confirm brachial plexus injury. Patient was seen by Neurosurgery and recommended for physical therapy management. He denies numbness/tingling/parasthesias. His primary compliant is weakness and lack of function of the L arm.    Pain: 0/10 current; 8/10 worst   SOO: traction injury   DOI: 10/26/24 (work related injury)   Goals: functional use of arm, return to work at [employer]      Objective     Strength  R 115 lb  L 20 lb    Upper Quarter Screen  Myotomes:  Weak throughout entire LUE      Biceps: 2+/5  Brachialis: 2+/5  Brachioradialis: 3+/5  Triceps: 1/5  Wrist flexor: 2/5  Wrist extensor: 1/5  Deltoid: 3/5    Dermatomes:   Sensation WNL B/L    Manual Muscle  Testing:  Shoulder flexion:   L 2/5   Shoulder abduction:  L 2/5   Shoulder ER @ 0:  L 2/5   Shoulder IR @ 0:  L 2/5   Biceps Brachii:  L 2/5   Brachioradialis:  L 2/5   Brachialis:   L 2/5  Wrist Extensor:   L 2/5   Wrist Flexor:    L 1/5   Triceps:    L 1/5       Shoulder Active Range of Motion:   Flexion:   L 100 deg    Abduction:   L 90 deg    Functional ER:  L C7    Functional IR:   L Ipsilateral PSIS      Shoulder Passive Range of Motion:   Flexion:   L 120 deg; ERP    Abduction:   L 90 deg; ERP    IR @ 0:   L full    ER @ 0:  L 15 deg; ERP      Neurodynamic Testing  Median Nerve: pos L  Ulnar Nerve: pos L  Radial Nerve: pos L           Insurance Billing Rule ReEval POC expires   [facility]  9/5/25                               Visit/Unit Tracking  N/a Date 6/13                     Used 1                     Remaining                        Pertinent Findings:      POC End Date: 9/5/25                                                                                          Test / Measure  6/13/2025     FOTO (Predicted 60) 26   Shoulder flexion AROM 100 deg   Shoulder abduction AROM 90 deg   Shoulder girdle MMT 2/5 globally   Elbow flex/ext MMT 2/5 flex  1/5 ext       Precautions: brachial plexus traction injury 10/26/24      Manuals 6/13            Shoulder PROM                                                    Neuro Re-Ed                                                                                           Education HEP and POC            Ther Ex             Nerve glides             Pball table roll             Wall slide             ER @ 0             IR @ 0             Weight bearing             Prone peirscapular                                                    Ther Activity                                       Gait Training                                       Modalities

## 2025-06-20 ENCOUNTER — TELEPHONE (OUTPATIENT)
Dept: NEUROSURGERY | Facility: CLINIC | Age: 55
End: 2025-06-20

## 2025-06-20 ENCOUNTER — OFFICE VISIT (OUTPATIENT)
Dept: PHYSICAL THERAPY | Facility: CLINIC | Age: 55
End: 2025-06-20
Attending: NEUROLOGICAL SURGERY
Payer: COMMERCIAL

## 2025-06-20 DIAGNOSIS — G54.0 BRACHIAL PLEXOPATHY: Primary | ICD-10-CM

## 2025-06-20 PROCEDURE — 97140 MANUAL THERAPY 1/> REGIONS: CPT | Performed by: PHYSICAL THERAPIST

## 2025-06-20 PROCEDURE — 97110 THERAPEUTIC EXERCISES: CPT | Performed by: PHYSICAL THERAPIST

## 2025-06-20 NOTE — PROGRESS NOTES
Daily Note     Today's date: 2025  Patient name: Ana Rivas  : 1970  MRN: [id number]  Referring provider: Irwin Dominguez,*  Dx:   Encounter Diagnosis     ICD-10-CM    1. Brachial plexopathy  G54.0                      Subjective: Patient reports compliance with HEP. Resistance band exercises were most difficult for his.       Objective: See treatment diary below      Assessment: Focus of treatment today on ROM and neural mobility. Patient reports end range pain in all planes during shoulder PROM. No increase in nerve symptoms with nerve glides. I advised patient to continue with HEP and reviewed realistic timeline to regain strength/function of muscle. I advised patient to schedule an evaluation for hand therapy.       Plan: Continue per plan of care.        Insurance Billing Rule ReEval POC expires   [facility]  25                               Visit/Unit Tracking  N/a Date                     Used 1 2                    Remaining                        Pertinent Findings:      POC End Date: 25                                                                                          Test / Measure  2025     FOTO (Predicted 60) 26   Shoulder flexion AROM 100 deg   Shoulder abduction AROM 90 deg   Shoulder girdle MMT 2/5 globally   Elbow flex/ext MMT 2/5 flex  1/5 ext       Precautions: brachial plexus traction injury 10/26/24      Manuals            Shoulder PROM  TB           Modified median nerve glide (45 deg abd)  TB                                     Neuro Re-Ed                                                                                           Education HEP and POC            Ther Ex             Seated UE ranger flexion  5 min           Seated UE ranger abd  5 min           Nerve glides             Pball table roll             Wall slide             ER @ 0             IR @ 0             Weight bearing             Prone peirscapular                                                     Ther Activity                                       Gait Training                                       Modalities

## 2025-06-20 NOTE — LETTER
June 20, 2025     Patient: Ana Rivas  YOB: 1970  Date of Visit: 6/20/2025      To Whom it May Concern:    Ana Rivas is under my professional care. Ana was seen in my office on 5/8/2025. Please excused Ana from work for up to 4 hours per day, up to 3 times per week for physical therapy for 12 weeks (through 9/12/2025).    If you have any questions or concerns, please don't hesitate to call.         Sincerely,          Irwin Dominguez        CC: No Recipients

## 2025-07-03 ENCOUNTER — ANESTHESIA EVENT (OUTPATIENT)
Dept: ANESTHESIOLOGY | Facility: HOSPITAL | Age: 55
End: 2025-07-03

## 2025-07-03 ENCOUNTER — ANESTHESIA (OUTPATIENT)
Dept: ANESTHESIOLOGY | Facility: HOSPITAL | Age: 55
End: 2025-07-03

## 2025-08-06 ENCOUNTER — HOSPITAL ENCOUNTER (OUTPATIENT)
Dept: NEUROLOGY | Facility: CLINIC | Age: 55
Discharge: HOME | End: 2025-08-06
Payer: OTHER MISCELLANEOUS

## 2025-08-13 ENCOUNTER — TELEPHONE (OUTPATIENT)
Dept: FAMILY MEDICINE CLINIC | Facility: CLINIC | Age: 55
End: 2025-08-13

## 2025-08-21 ENCOUNTER — OFFICE VISIT (OUTPATIENT)
Dept: FAMILY MEDICINE CLINIC | Facility: CLINIC | Age: 55
End: 2025-08-21

## 2025-08-21 VITALS
OXYGEN SATURATION: 97 % | WEIGHT: 214 LBS | TEMPERATURE: 98.1 F | DIASTOLIC BLOOD PRESSURE: 88 MMHG | HEART RATE: 77 BPM | BODY MASS INDEX: 27.48 KG/M2 | SYSTOLIC BLOOD PRESSURE: 152 MMHG | RESPIRATION RATE: 16 BRPM

## 2025-08-21 DIAGNOSIS — I10 PRIMARY HYPERTENSION: ICD-10-CM

## 2025-08-21 DIAGNOSIS — S14.3XXD INJURY OF LEFT BRACHIAL PLEXUS, SUBSEQUENT ENCOUNTER: ICD-10-CM

## 2025-08-21 DIAGNOSIS — Z01.818 PREOP EXAMINATION: Primary | ICD-10-CM

## 2025-08-21 PROCEDURE — 99213 OFFICE O/P EST LOW 20 MIN: CPT | Performed by: FAMILY MEDICINE
